# Patient Record
Sex: FEMALE | Race: WHITE | NOT HISPANIC OR LATINO | Employment: OTHER | URBAN - METROPOLITAN AREA
[De-identification: names, ages, dates, MRNs, and addresses within clinical notes are randomized per-mention and may not be internally consistent; named-entity substitution may affect disease eponyms.]

---

## 2017-06-22 ENCOUNTER — TRANSCRIBE ORDERS (OUTPATIENT)
Dept: ADMINISTRATIVE | Facility: HOSPITAL | Age: 57
End: 2017-06-22

## 2017-06-22 DIAGNOSIS — Z12.31 VISIT FOR SCREENING MAMMOGRAM: Primary | ICD-10-CM

## 2017-07-25 ENCOUNTER — HOSPITAL ENCOUNTER (OUTPATIENT)
Dept: MAMMOGRAPHY | Facility: MEDICAL CENTER | Age: 57
Discharge: HOME | End: 2017-07-25
Payer: COMMERCIAL

## 2017-07-25 DIAGNOSIS — Z12.31 VISIT FOR SCREENING MAMMOGRAM: ICD-10-CM

## 2017-07-25 PROCEDURE — G0202 SCR MAMMO BI INCL CAD: HCPCS

## 2017-12-06 PROCEDURE — 87624 HPV HI-RISK TYP POOLED RSLT: CPT | Performed by: OBSTETRICS & GYNECOLOGY

## 2017-12-06 PROCEDURE — G0145 SCR C/V CYTO,THINLAYER,RESCR: HCPCS | Performed by: OBSTETRICS & GYNECOLOGY

## 2017-12-08 ENCOUNTER — LAB REQUISITION (OUTPATIENT)
Dept: LAB | Facility: HOSPITAL | Age: 57
End: 2017-12-08
Payer: COMMERCIAL

## 2017-12-08 DIAGNOSIS — Z01.419 ENCOUNTER FOR GYNECOLOGICAL EXAMINATION WITHOUT ABNORMAL FINDING: ICD-10-CM

## 2017-12-12 LAB — HPV RRNA GENITAL QL NAA+PROBE: NORMAL

## 2017-12-13 LAB
LAB AP GYN PRIMARY INTERPRETATION: NORMAL
Lab: NORMAL

## 2018-08-22 ENCOUNTER — TRANSCRIBE ORDERS (OUTPATIENT)
Dept: ADMINISTRATIVE | Facility: HOSPITAL | Age: 58
End: 2018-08-22

## 2018-08-22 DIAGNOSIS — Z12.39 SCREENING BREAST EXAMINATION: Primary | ICD-10-CM

## 2018-10-02 ENCOUNTER — HOSPITAL ENCOUNTER (OUTPATIENT)
Dept: MAMMOGRAPHY | Facility: MEDICAL CENTER | Age: 58
Discharge: HOME | End: 2018-10-02
Payer: COMMERCIAL

## 2018-10-02 DIAGNOSIS — Z12.39 SCREENING BREAST EXAMINATION: ICD-10-CM

## 2018-10-02 PROCEDURE — 77067 SCR MAMMO BI INCL CAD: CPT

## 2019-02-06 ENCOUNTER — TELEPHONE (OUTPATIENT)
Dept: FAMILY MEDICINE CLINIC | Facility: CLINIC | Age: 59
End: 2019-02-06

## 2019-02-08 ENCOUNTER — ANNUAL EXAM (OUTPATIENT)
Dept: GYNECOLOGY | Facility: CLINIC | Age: 59
End: 2019-02-08
Payer: COMMERCIAL

## 2019-02-08 VITALS
HEIGHT: 64 IN | SYSTOLIC BLOOD PRESSURE: 112 MMHG | RESPIRATION RATE: 16 BRPM | BODY MASS INDEX: 23.93 KG/M2 | HEART RATE: 81 BPM | DIASTOLIC BLOOD PRESSURE: 64 MMHG | WEIGHT: 140.2 LBS | TEMPERATURE: 98.1 F

## 2019-02-08 DIAGNOSIS — Z01.419 ENCOUNTER FOR GYNECOLOGICAL EXAMINATION (GENERAL) (ROUTINE) WITHOUT ABNORMAL FINDINGS: Primary | ICD-10-CM

## 2019-02-08 DIAGNOSIS — Z12.31 ENCOUNTER FOR SCREENING MAMMOGRAM FOR MALIGNANT NEOPLASM OF BREAST: ICD-10-CM

## 2019-02-08 PROCEDURE — S0612 ANNUAL GYNECOLOGICAL EXAMINA: HCPCS | Performed by: OBSTETRICS & GYNECOLOGY

## 2019-02-08 RX ORDER — UREA 10 %
LOTION (ML) TOPICAL
COMMUNITY
End: 2021-12-13

## 2019-02-08 RX ORDER — ESCITALOPRAM OXALATE 5 MG/1
TABLET ORAL
COMMUNITY
End: 2021-12-13

## 2019-02-08 RX ORDER — ESTRADIOL AND NORETHINDRONE ACETATE 1; .5 MG/1; MG/1
TABLET ORAL
COMMUNITY
End: 2020-06-03 | Stop reason: ALTCHOICE

## 2019-02-08 NOTE — PROGRESS NOTES
Assessment/Plan:       Diagnoses and all orders for this visit:    Encounter for gynecological examination (general) (routine) without abnormal findings  -     Cancel: Liquid-based pap, screening    Screening for human papillomavirus (HPV)  -     Liquid-based pap, screening    Encounter for screening mammogram for malignant neoplasm of breast  -     Mammo screening bilateral w 3d & cad; Future    Other orders  -     calcium carbonate (OS-MILY) 1250 (500 Ca) MG chewable tablet; qd  -     escitalopram (LEXAPRO) 5 mg tablet; escitalopram 5 mg tablet  -     estradiol-norethindrone (ACTIVELLA) 1-0 5 MG per tablet; estradiol-norethindrone acet 0 5 mg-0 1 mg tablet  -     Multiple Vitamins-Minerals (MULTIVITAMIN ADULT EXTRA C PO); Take 1 capsule by mouth          Subjective:      Patient ID: Jules Lindsay is a 63 y o  female  The patient is a 20-year-old who presents for an annual gyn exam   She denies any vaginal bleeding, breast problems, or bladder issues  She is not sexually active  The following portions of the patient's history were reviewed and updated as appropriate: allergies, current medications, past family history, past medical history, past social history, past surgical history and problem list     Review of Systems   Constitutional: Negative  Respiratory: Negative for shortness of breath  Cardiovascular: Negative for chest pain  Gastrointestinal: Negative  Genitourinary: Negative  Skin: Negative  Psychiatric/Behavioral: Negative for agitation, behavioral problems and confusion  Objective:      /64 (BP Location: Right arm, Patient Position: Sitting, Cuff Size: Standard)   Pulse 81   Temp 98 1 °F (36 7 °C) (Tympanic)   Resp 16   Ht 5' 3 75" (1 619 m)   Wt 63 6 kg (140 lb 3 2 oz)   LMP  (LMP Unknown)   BMI 24 25 kg/m²          Physical Exam   Constitutional: She appears well-developed and well-nourished  No distress  HENT:   Head: Normocephalic  Pulmonary/Chest: Effort normal  No respiratory distress  Right breast exhibits no inverted nipple, no mass, no nipple discharge, no skin change and no tenderness  Left breast exhibits no inverted nipple, no mass, no nipple discharge, no skin change and no tenderness  Breasts are symmetrical    Abdominal: She exhibits no distension and no mass  There is no tenderness  There is no rebound and no guarding  Genitourinary: Rectum normal and uterus normal  No labial fusion  There is no rash, tenderness, lesion or injury on the right labia  There is no rash, tenderness, lesion or injury on the left labia  Uterus is not tender  Cervix exhibits no motion tenderness, no discharge and no friability  Right adnexum displays no mass, no tenderness and no fullness  Left adnexum displays no mass, no tenderness and no fullness  No erythema, tenderness or bleeding in the vagina  No foreign body in the vagina  No signs of injury around the vagina  No vaginal discharge found  Lymphadenopathy:        Right axillary: No pectoral and no lateral adenopathy present  Left axillary: No pectoral and no lateral adenopathy present  Skin: Skin is warm, dry and intact  She is not diaphoretic  No cyanosis  Nails show no clubbing  Psychiatric: She has a normal mood and affect   Her speech is normal and behavior is normal

## 2019-03-06 ENCOUNTER — TELEPHONE (OUTPATIENT)
Dept: FAMILY MEDICINE CLINIC | Facility: CLINIC | Age: 59
End: 2019-03-06

## 2019-03-06 DIAGNOSIS — N95.9 MENOPAUSAL DISORDER: Primary | ICD-10-CM

## 2019-03-06 RX ORDER — ESTRADIOL AND NORETHINDRONE ACETATE 1; .5 MG/1; MG/1
1 TABLET ORAL DAILY
Qty: 90 TABLET | Refills: 4 | Status: SHIPPED | OUTPATIENT
Start: 2019-03-06 | End: 2021-12-13

## 2019-03-06 NOTE — TELEPHONE ENCOUNTER
Patient called in regards to her medication refill for estradiol-norethindrone (ACTIVELLA) 1-0 5 MG per tablet - take   0 5 mg-0 1 mg tablet  She would like this called into [facility]   If there are any questions

## 2019-11-13 ENCOUNTER — HOSPITAL ENCOUNTER (OUTPATIENT)
Dept: MAMMOGRAPHY | Facility: MEDICAL CENTER | Age: 59
Discharge: HOME | End: 2019-11-13
Payer: COMMERCIAL

## 2019-11-13 VITALS — WEIGHT: 140.19 LBS | HEIGHT: 64 IN | BODY MASS INDEX: 23.93 KG/M2

## 2019-11-13 DIAGNOSIS — Z12.31 ENCOUNTER FOR SCREENING MAMMOGRAM FOR MALIGNANT NEOPLASM OF BREAST: ICD-10-CM

## 2019-11-13 PROCEDURE — 77063 BREAST TOMOSYNTHESIS BI: CPT

## 2019-11-13 PROCEDURE — 77067 SCR MAMMO BI INCL CAD: CPT

## 2019-12-30 ENCOUNTER — TELEPHONE (OUTPATIENT)
Dept: GYNECOLOGY | Facility: CLINIC | Age: 59
End: 2019-12-30

## 2019-12-30 NOTE — TELEPHONE ENCOUNTER
LM with patient that Dr Thang Kraus appointment is cancelled  Told her to call our office to make appointment

## 2020-05-04 ENCOUNTER — TELEMEDICINE (OUTPATIENT)
Dept: GYNECOLOGY | Facility: CLINIC | Age: 60
End: 2020-05-04
Payer: COMMERCIAL

## 2020-05-04 DIAGNOSIS — Z78.0 MENOPAUSE: Primary | ICD-10-CM

## 2020-05-04 PROCEDURE — 99213 OFFICE O/P EST LOW 20 MIN: CPT | Performed by: OBSTETRICS & GYNECOLOGY

## 2020-05-05 ENCOUNTER — TELEPHONE (OUTPATIENT)
Dept: GYNECOLOGY | Facility: CLINIC | Age: 60
End: 2020-05-05

## 2020-06-03 ENCOUNTER — ANNUAL EXAM (OUTPATIENT)
Dept: GYNECOLOGY | Facility: CLINIC | Age: 60
End: 2020-06-03
Payer: COMMERCIAL

## 2020-06-03 VITALS
HEART RATE: 73 BPM | WEIGHT: 164 LBS | SYSTOLIC BLOOD PRESSURE: 120 MMHG | BODY MASS INDEX: 30.18 KG/M2 | DIASTOLIC BLOOD PRESSURE: 78 MMHG | HEIGHT: 62 IN

## 2020-06-03 DIAGNOSIS — Z01.419 ENCOUNTER FOR GYNECOLOGICAL EXAMINATION (GENERAL) (ROUTINE) WITHOUT ABNORMAL FINDINGS: Primary | ICD-10-CM

## 2020-06-03 DIAGNOSIS — Z01.419 ENCOUNTER FOR GYNECOLOGICAL EXAMINATION WITH PAPANICOLAOU SMEAR OF CERVIX: ICD-10-CM

## 2020-06-03 DIAGNOSIS — Z12.31 SCREENING MAMMOGRAM, ENCOUNTER FOR: ICD-10-CM

## 2020-06-03 PROCEDURE — S0612 ANNUAL GYNECOLOGICAL EXAMINA: HCPCS | Performed by: OBSTETRICS & GYNECOLOGY

## 2020-06-03 PROCEDURE — 87624 HPV HI-RISK TYP POOLED RSLT: CPT | Performed by: OBSTETRICS & GYNECOLOGY

## 2020-06-03 PROCEDURE — G0145 SCR C/V CYTO,THINLAYER,RESCR: HCPCS | Performed by: OBSTETRICS & GYNECOLOGY

## 2020-06-06 LAB
HPV HR 12 DNA CVX QL NAA+PROBE: NEGATIVE
HPV16 DNA CVX QL NAA+PROBE: NEGATIVE
HPV18 DNA CVX QL NAA+PROBE: NEGATIVE

## 2020-06-16 LAB
LAB AP GYN PRIMARY INTERPRETATION: NORMAL
Lab: NORMAL

## 2020-08-03 ENCOUNTER — TELEPHONE (OUTPATIENT)
Dept: GYNECOLOGY | Facility: CLINIC | Age: 60
End: 2020-08-03

## 2020-08-03 NOTE — TELEPHONE ENCOUNTER
Carine Teixeira called and would like to ask some questions about weaning off of    estradiol-norethindrone (ACTIVELLA) 1-0 5 MG per tablet

## 2020-08-03 NOTE — TELEPHONE ENCOUNTER
I spoke with patient who states she has weaned down to every third day and is not having not flashes but is being worked up for other concerns with b/l leg discomfort, "tingling" and dizziness  I advised patient to inform PCP that she is weaning off of HRT

## 2020-11-18 ENCOUNTER — HOSPITAL ENCOUNTER (OUTPATIENT)
Dept: MAMMOGRAPHY | Facility: CLINIC | Age: 60
Discharge: HOME | End: 2020-11-18
Payer: COMMERCIAL

## 2020-11-18 DIAGNOSIS — Z12.31 SCREENING MAMMOGRAM, ENCOUNTER FOR: ICD-10-CM

## 2020-11-18 DIAGNOSIS — Z12.31 ENCOUNTER FOR SCREENING MAMMOGRAM FOR MALIGNANT NEOPLASM OF BREAST: ICD-10-CM

## 2020-11-18 PROCEDURE — 77067 SCR MAMMO BI INCL CAD: CPT

## 2020-11-18 PROCEDURE — 77063 BREAST TOMOSYNTHESIS BI: CPT

## 2021-12-13 ENCOUNTER — ANNUAL EXAM (OUTPATIENT)
Dept: GYNECOLOGY | Facility: CLINIC | Age: 61
End: 2021-12-13
Payer: COMMERCIAL

## 2021-12-13 VITALS
HEART RATE: 77 BPM | SYSTOLIC BLOOD PRESSURE: 118 MMHG | DIASTOLIC BLOOD PRESSURE: 74 MMHG | WEIGHT: 153 LBS | HEIGHT: 62 IN | BODY MASS INDEX: 28.16 KG/M2

## 2021-12-13 DIAGNOSIS — Z78.0 MENOPAUSE: ICD-10-CM

## 2021-12-13 DIAGNOSIS — Z13.820 OSTEOPOROSIS SCREENING: ICD-10-CM

## 2021-12-13 DIAGNOSIS — R10.2 PELVIC PAIN: ICD-10-CM

## 2021-12-13 DIAGNOSIS — Z01.411 ENCOUNTER FOR GYNECOLOGICAL EXAMINATION (GENERAL) (ROUTINE) WITH ABNORMAL FINDINGS: Primary | ICD-10-CM

## 2021-12-13 DIAGNOSIS — Z12.31 SCREENING MAMMOGRAM FOR BREAST CANCER: ICD-10-CM

## 2021-12-13 PROCEDURE — S0612 ANNUAL GYNECOLOGICAL EXAMINA: HCPCS | Performed by: OBSTETRICS & GYNECOLOGY

## 2021-12-13 RX ORDER — MULTIVITAMIN/IRON/FOLIC ACID 18MG-0.4MG
TABLET ORAL
COMMUNITY
Start: 2021-08-01

## 2021-12-13 RX ORDER — BUPROPION HYDROCHLORIDE 150 MG/1
TABLET ORAL
COMMUNITY
Start: 2021-08-05

## 2021-12-13 RX ORDER — AMOXICILLIN 500 MG
CAPSULE ORAL
COMMUNITY
Start: 2020-12-13

## 2022-02-04 ENCOUNTER — TELEPHONE (OUTPATIENT)
Dept: GYNECOLOGY | Facility: CLINIC | Age: 62
End: 2022-02-04

## 2022-02-04 NOTE — TELEPHONE ENCOUNTER
Spoke with patient who states she has not had TVU done to date  She stopped nuts and pain resolved until a week ago when she had an english muffin with peanut butter and pain returned  Advised that if associated with food, pain is more than likely GI related  Patient will cont to monitor and call with any questions

## 2022-02-22 ENCOUNTER — TELEPHONE (OUTPATIENT)
Dept: GYNECOLOGY | Facility: CLINIC | Age: 62
End: 2022-02-22

## 2022-02-22 NOTE — TELEPHONE ENCOUNTER
Alyson Charles called and states She had a Dexa done at [facility] 4/29/21 and is going to cancel the appt  She has scheduled since it has not been 2 years  Pt wanted to make you aware

## 2022-03-09 ENCOUNTER — APPOINTMENT (OUTPATIENT)
Dept: BONE DENSITY | Facility: MEDICAL CENTER | Age: 62
End: 2022-03-09
Payer: COMMERCIAL

## 2022-03-09 ENCOUNTER — HOSPITAL ENCOUNTER (OUTPATIENT)
Dept: MAMMOGRAPHY | Facility: MEDICAL CENTER | Age: 62
Discharge: HOME | End: 2022-03-09
Payer: COMMERCIAL

## 2022-03-09 VITALS — HEIGHT: 62 IN | BODY MASS INDEX: 28.16 KG/M2 | WEIGHT: 153 LBS

## 2022-03-09 DIAGNOSIS — Z12.31 SCREENING MAMMOGRAM FOR BREAST CANCER: ICD-10-CM

## 2022-03-09 PROCEDURE — 77067 SCR MAMMO BI INCL CAD: CPT

## 2022-03-09 PROCEDURE — 77063 BREAST TOMOSYNTHESIS BI: CPT

## 2022-03-23 ENCOUNTER — DOCUMENTATION (OUTPATIENT)
Dept: GYNECOLOGY | Facility: CLINIC | Age: 62
End: 2022-03-23

## 2022-03-30 ENCOUNTER — DOCUMENTATION (OUTPATIENT)
Dept: GYNECOLOGY | Facility: CLINIC | Age: 62
End: 2022-03-30

## 2022-03-30 NOTE — PROGRESS NOTES
Called she is not going to need the U/S , felt bad a t the time  But is fine now  Also no need, for mammo  , dexa  Either  They are up to date

## 2023-01-09 NOTE — PROGRESS NOTES
Assessment/Plan:    Recommended monthly SBE, annual CBE and annual screening mammo  ASCCP guidelines reviewed and pap with cotesting noted to be up to date; this low risk patient was advised she meets criteria to d/c pap screening at age 73  Pap done today  DEXA ordered and colonoscopy noted to be up to date  Reviewed diet/activity recommendations Calcium 1200 mg and Vit D 600-1000 IU daily  Discussed postmenopausal considerations and symptoms to report  Kegel exercises as instructed  RTO in one year for routine annual gyn exam or sooner PRN  Diagnoses and all orders for this visit:    Encounter for gynecological examination (general) (routine) without abnormal findings    Screening mammogram for breast cancer  -     Mammo screening bilateral w 3d & cad; Future    Osteoporosis screening  -     DXA bone density spine hip and pelvis; Future    Menopause  -     DXA bone density spine hip and pelvis; Future    Well woman exam with routine gynecological exam  -     Liquid-based pap, screening    Encounter for screening for malignant neoplasm of cervix  -     Liquid-based pap, screening    Screening for HPV (human papillomavirus)  -     Liquid-based pap, screening    Other orders  -     levocetirizine (XYZAL) 5 MG tablet; Take 5 mg by mouth every evening  -     calcium citrate-vitamin D 315 mg-5 mcg tablet; Take 1 tablet by mouth 2 (two) times a day        Subjective:      Patient ID: Faith Crowe is a 65 y o  female  This patient presents for routine annual gyn exam   Hx of genital herpes, denies recent outbreaks  She denies  bleeding or spotting, VM sx, pelvic pain, dyspareunia, breast concerns, abnormal discharge, bowel/bladder dysfunction, depression/anx  Not sexually active  Pap/HPV up to date and normal, 6/3/20  Mammography up to date and normal, 3/9/22  Osteoporosis screening not done to date  Colonoscopy 4/28/16  Family hx of breast and colon cancer           The following portions of the patient's history were reviewed and updated as appropriate: allergies, current medications, past family history, past medical history, past social history, past surgical history and problem list     Review of Systems   Constitutional: Negative  Respiratory: Negative  Cardiovascular: Negative  Gastrointestinal: Negative  Endocrine: Negative  Genitourinary: Negative for dysuria, frequency, pelvic pain, urgency, vaginal bleeding, vaginal discharge and vaginal pain  Musculoskeletal: Negative  Skin: Negative  Neurological: Negative  Psychiatric/Behavioral: Negative  Objective:      /82 (BP Location: Right arm, Patient Position: Sitting, Cuff Size: Standard)   Pulse 83   Ht 5' 2" (1 575 m)   Wt 72 1 kg (159 lb)   LMP  (LMP Unknown)   BMI 29 08 kg/m²          Physical Exam  Vitals and nursing note reviewed  Exam conducted with a chaperone present  Constitutional:       Appearance: Normal appearance  She is well-developed  HENT:      Head: Normocephalic and atraumatic  Neck:      Thyroid: No thyroid mass or thyromegaly  Cardiovascular:      Rate and Rhythm: Normal rate and regular rhythm  Heart sounds: Normal heart sounds  Pulmonary:      Effort: Pulmonary effort is normal       Breath sounds: Normal breath sounds  Chest:   Breasts:     Breasts are symmetrical       Right: No inverted nipple, mass, nipple discharge, skin change or tenderness  Left: No inverted nipple, mass, nipple discharge, skin change or tenderness  Abdominal:      General: Bowel sounds are normal       Palpations: Abdomen is soft  Tenderness: There is no abdominal tenderness  Hernia: There is no hernia in the left inguinal area or right inguinal area  Genitourinary:     General: Normal vulva  Exam position: Supine  Pubic Area: No rash  Labia:         Right: No rash, tenderness, lesion or injury  Left: No rash, tenderness, lesion or injury  Urethra: No prolapse, urethral pain, urethral swelling or urethral lesion  Vagina: Normal  No signs of injury and foreign body  No vaginal discharge, erythema, tenderness, bleeding, lesions or prolapsed vaginal walls  Cervix: No cervical motion tenderness, discharge, friability, lesion, erythema, cervical bleeding or eversion  Uterus: Not deviated, not enlarged, not fixed, not tender and no uterine prolapse  Adnexa:         Right: No mass, tenderness or fullness  Left: No mass, tenderness or fullness  Rectum: No external hemorrhoid  Comments: Urethra normal without lesions  No bladder tenderness  Musculoskeletal:         General: Normal range of motion  Cervical back: Normal range of motion and neck supple  Lymphadenopathy:      Lower Body: No right inguinal adenopathy  No left inguinal adenopathy  Skin:     General: Skin is warm and dry  Neurological:      Mental Status: She is alert and oriented to person, place, and time  Psychiatric:         Speech: Speech normal          Behavior: Behavior normal  Behavior is cooperative

## 2023-01-11 ENCOUNTER — ANNUAL EXAM (OUTPATIENT)
Dept: GYNECOLOGY | Facility: CLINIC | Age: 63
End: 2023-01-11

## 2023-01-11 VITALS
BODY MASS INDEX: 29.26 KG/M2 | SYSTOLIC BLOOD PRESSURE: 124 MMHG | HEART RATE: 83 BPM | HEIGHT: 62 IN | WEIGHT: 159 LBS | DIASTOLIC BLOOD PRESSURE: 82 MMHG

## 2023-01-11 DIAGNOSIS — Z01.419 ENCOUNTER FOR GYNECOLOGICAL EXAMINATION (GENERAL) (ROUTINE) WITHOUT ABNORMAL FINDINGS: Primary | ICD-10-CM

## 2023-01-11 DIAGNOSIS — Z11.51 SCREENING FOR HPV (HUMAN PAPILLOMAVIRUS): ICD-10-CM

## 2023-01-11 DIAGNOSIS — Z01.419 WELL WOMAN EXAM WITH ROUTINE GYNECOLOGICAL EXAM: ICD-10-CM

## 2023-01-11 DIAGNOSIS — Z13.820 OSTEOPOROSIS SCREENING: ICD-10-CM

## 2023-01-11 DIAGNOSIS — Z12.31 SCREENING MAMMOGRAM FOR BREAST CANCER: ICD-10-CM

## 2023-01-11 DIAGNOSIS — Z12.4 ENCOUNTER FOR SCREENING FOR MALIGNANT NEOPLASM OF CERVIX: ICD-10-CM

## 2023-01-11 DIAGNOSIS — Z78.0 MENOPAUSE: ICD-10-CM

## 2023-01-11 RX ORDER — LANOLIN ALCOHOL/MO/W.PET/CERES
1 CREAM (GRAM) TOPICAL 2 TIMES DAILY
COMMUNITY

## 2023-01-11 RX ORDER — LEVOCETIRIZINE DIHYDROCHLORIDE 5 MG/1
5 TABLET, FILM COATED ORAL EVERY EVENING
COMMUNITY
Start: 2022-12-23

## 2023-01-20 LAB
LAB AP GYN PRIMARY INTERPRETATION: NORMAL
Lab: NORMAL

## 2023-07-25 ENCOUNTER — TELEPHONE (OUTPATIENT)
Dept: GYNECOLOGY | Facility: CLINIC | Age: 63
End: 2023-07-25

## 2023-09-13 ENCOUNTER — HOSPITAL ENCOUNTER (OUTPATIENT)
Dept: MAMMOGRAPHY | Facility: MEDICAL CENTER | Age: 63
Discharge: HOME | End: 2023-09-13
Payer: COMMERCIAL

## 2023-09-13 VITALS — HEIGHT: 62 IN | BODY MASS INDEX: 29.25 KG/M2 | WEIGHT: 158.95 LBS

## 2023-09-13 DIAGNOSIS — Z12.31 SCREENING MAMMOGRAM FOR BREAST CANCER: ICD-10-CM

## 2023-09-13 PROCEDURE — 77067 SCR MAMMO BI INCL CAD: CPT

## 2023-09-13 PROCEDURE — 77063 BREAST TOMOSYNTHESIS BI: CPT

## 2023-09-15 NOTE — PROGRESS NOTES
Call placed to patient regarding recommendation for additional mammogram and ultrasound imaging and;    _____ RIGHT ___X___LEFT      __X___ Ultrasound guided  ______ Stereotactic breast biopsy. Explained the need for C[facility] imaging and the biopsy to follow if there is enhancement on the imaging, all questions answered for the patient. Explained the location of this imaging at our [facility] location. __X___Verbalized understanding.       Blood thinners:  No: __X___ Yes: ______ What:     Patient offered One Stop Appointment: Accepted ___x_____  Declined: _______  Reason:            All questions related to the CESM imaging completed, pt with no history that warrants lab work/explained the need for lab work prior to imaging due to the administration of IV contrast, adv that I will call her provider and get this ordered, adv the patient to get this done within the next week so we have those results before her testing, pt states understanding       Biopsy teaching sheet given:  ____ yes __X___no (All teaching points discussed during call, pt with no questions at this time, pt adv to arrive at 0800for 0830 CESM followed by 0900 US then 0930 biopsy)    Pt given contact information and adv to call with any questions/needs    Patient given address and directions to the [facility] in [facility]

## 2023-09-19 ENCOUNTER — HOSPITAL ENCOUNTER (OUTPATIENT)
Dept: MAMMOGRAPHY | Facility: CLINIC | Age: 63
Discharge: HOME | End: 2023-09-19
Payer: COMMERCIAL

## 2023-09-19 ENCOUNTER — HOSPITAL ENCOUNTER (OUTPATIENT)
Dept: ULTRASOUND IMAGING | Facility: CLINIC | Age: 63
Discharge: HOME | End: 2023-09-19
Payer: COMMERCIAL

## 2023-09-19 VITALS — DIASTOLIC BLOOD PRESSURE: 98 MMHG | HEART RATE: 62 BPM | SYSTOLIC BLOOD PRESSURE: 147 MMHG

## 2023-09-19 VITALS — HEART RATE: 65 BPM | SYSTOLIC BLOOD PRESSURE: 131 MMHG | DIASTOLIC BLOOD PRESSURE: 98 MMHG

## 2023-09-19 DIAGNOSIS — R92.8 ABNORMAL ULTRASOUND OF BREAST: ICD-10-CM

## 2023-09-19 DIAGNOSIS — R92.8 ABNORMAL MAMMOGRAM: ICD-10-CM

## 2023-09-19 PROCEDURE — 88360 TUMOR IMMUNOHISTOCHEM/MANUAL: CPT | Performed by: PATHOLOGY

## 2023-09-19 PROCEDURE — 88342 IMHCHEM/IMCYTCHM 1ST ANTB: CPT | Performed by: PATHOLOGY

## 2023-09-19 PROCEDURE — 19083 BX BREAST 1ST LESION US IMAG: CPT

## 2023-09-19 PROCEDURE — A4648 IMPLANTABLE TISSUE MARKER: HCPCS

## 2023-09-19 PROCEDURE — 76642 ULTRASOUND BREAST LIMITED: CPT

## 2023-09-19 PROCEDURE — 88305 TISSUE EXAM BY PATHOLOGIST: CPT | Performed by: PATHOLOGY

## 2023-09-19 PROCEDURE — 88341 IMHCHEM/IMCYTCHM EA ADD ANTB: CPT | Performed by: PATHOLOGY

## 2023-09-19 PROCEDURE — 77066 DX MAMMO INCL CAD BI: CPT

## 2023-09-19 RX ORDER — LIDOCAINE HYDROCHLORIDE 10 MG/ML
5 INJECTION, SOLUTION EPIDURAL; INFILTRATION; INTRACAUDAL; PERINEURAL ONCE
Status: COMPLETED | OUTPATIENT
Start: 2023-09-19 | End: 2023-09-19

## 2023-09-19 RX ORDER — LIDOCAINE HYDROCHLORIDE 10 MG/ML
5 INJECTION, SOLUTION EPIDURAL; INFILTRATION; INTRACAUDAL; PERINEURAL ONCE
Status: DISCONTINUED | OUTPATIENT
Start: 2023-09-19 | End: 2023-09-20 | Stop reason: HOSPADM

## 2023-09-19 RX ADMIN — IOHEXOL 100 ML: 350 INJECTION, SOLUTION INTRAVENOUS at 08:42

## 2023-09-19 RX ADMIN — LIDOCAINE HYDROCHLORIDE 5 ML: 10 INJECTION, SOLUTION EPIDURAL; INFILTRATION; INTRACAUDAL; PERINEURAL at 09:29

## 2023-09-19 NOTE — PROGRESS NOTES
Procedure type:    ___x__ultrasound guided _____stereotactic    Breast:    ___x__Left _____Right    Location:12 o'clock 3cmfn    Needle: 12G Destiney    # of passes:4    Clip:Leola  Reflector 7.5cm    Performed by:Dr. Teague    Pressure held for 5 minutes by:Delfina Hoff Strips:    ___X__yes _____no    Gail Hunger aid:    __X___yes_____no    Tolerated procedure:    __X___yes _____no

## 2023-09-20 NOTE — PROGRESS NOTES
Post procedure call completed    Bleeding: _____yes __X___no    Pain: _____yes ___X___no    Redness/Swelling: ______yes ___X___no    Band aid removed: _____yes ___X__no (discussed removing when she showers)    Steri-Strips intact: ___X___yes _____no (discussed with patient to remove steri strips on 9/24/2023 if they have not come off on their own)    Pt with no questions at this time, adv will call when results available, adv to call with any questions or concerns, has name/# for contact

## 2023-09-21 ENCOUNTER — CLINICAL SUPPORT (OUTPATIENT)
Dept: GENETICS | Facility: CLINIC | Age: 63
End: 2023-09-21

## 2023-09-21 ENCOUNTER — TELEPHONE (OUTPATIENT)
Dept: GENETICS | Facility: CLINIC | Age: 63
End: 2023-09-21

## 2023-09-21 ENCOUNTER — DOCUMENTATION (OUTPATIENT)
Dept: HEMATOLOGY ONCOLOGY | Facility: CLINIC | Age: 63
End: 2023-09-21

## 2023-09-21 ENCOUNTER — DOCUMENTATION (OUTPATIENT)
Dept: GENETICS | Facility: CLINIC | Age: 63
End: 2023-09-21

## 2023-09-21 ENCOUNTER — TELEPHONE (OUTPATIENT)
Dept: MAMMOGRAPHY | Facility: CLINIC | Age: 63
End: 2023-09-21

## 2023-09-21 DIAGNOSIS — C50.912 MALIGNANT NEOPLASM OF LEFT FEMALE BREAST, UNSPECIFIED ESTROGEN RECEPTOR STATUS, UNSPECIFIED SITE OF BREAST (HCC): Primary | ICD-10-CM

## 2023-09-21 PROCEDURE — 88341 IMHCHEM/IMCYTCHM EA ADD ANTB: CPT | Performed by: PATHOLOGY

## 2023-09-21 PROCEDURE — 88305 TISSUE EXAM BY PATHOLOGIST: CPT | Performed by: PATHOLOGY

## 2023-09-21 PROCEDURE — 88342 IMHCHEM/IMCYTCHM 1ST ANTB: CPT | Performed by: PATHOLOGY

## 2023-09-21 NOTE — PROGRESS NOTES
IIntake received/ Chart reviewed for services completed outside of [facility]    Pathology completed: 9/19/23    Imaging completed: Screening Mammogram done: 9/13/23  Diagnostic Mammogram and Ultrasound done: 9/19/23    All records needed are in patients chart. No records retrieval needed at this time.

## 2023-09-21 NOTE — LETTER
September 21, 2023       No Recipients    Patient: Meagan Nolasco  YOB: 1960  Date of Visit: 9/21/2023      Dear Dr. Fernando Armando Recipients: Thank you for referring Chivo Xie to me for evaluation. Below are my notes for this consultation. If you have questions, please do not hesitate to call me. I look forward to following your patient along with you. Sincerely,        Mango Mckeon        CC:   No Recipients        I introduced myself to Julio Bryan and let her know that her nurse navigator reached out to the cancer risk and genetics program on her behalf. I reviewed the following with [place] Corie Silva: While the majority of cancer occurs by chance, approximately 5-10% of breast cancer has an underlying genetic cause. Genetic testing is available which can determine if there is an underlying genetic cause to your cancer. Understanding if there is an underlying genetic cause can:  Provide your surgeon with additional information to help with surgical decisions, treatment decisions and eligibility for clinical trials. It can determine if you have an increased risk for any additional cancers. Help family members understand their cancer risk. We work closely with the [facility] breast surgeons and are reaching out to see if you have interest in genetic testing. The reason we are reaching out at this time is that this result may help your surgeon determine the appropriate type of surgery (i.e. lumpectomy vs mastectomy). This test is not a requirement but can take 5-10 days to complete so we would like to start the process as soon as possible so the results are ready for your appointment with your surgeon. If you are interested in genetic testing, I can collect your family history and initiate genetic testing for you.         Patient elected to pursue testing     Diagnosis Details:  Invasive Ductal Carcinoma  Left  Hormone receptors pending  Personal History:  Do you have a personal history of any other cancer? No  If yes type/age of diagnosis:   Family history:   Do you have Ashkenazi Islam ancestry? No  If yes, maternal, paternal, or both? Do you have any children? Yes  How many sons? 1  How many daughters? 1 (passed away 11 hours after birth)   Do any of your children have a history of cancer? No  If yes type/age of diagnosis:     Do you have any brothers or sisters? Yes  How many brothers? 2  How many sisters? 1  Are they from the same parents? Yes  If no how maternal/paternal half-siblings:  Do any of your brothers or sisters have a history of cancer? No  If yes who and the type/age of diagnosis:           Do you have nieces or nephews? Yes  Do any of them have a history of cancer? Yes  If yes type/age of diagnosis:   Nephew: unknown cancer, ? Lymphoma, diagnosed at 44 (currently 38 y/o)   We reviewed that there is emerging evidence that Lymphoma may be a hereditary cancer but we cannot test for a predisposition for it at this time. Does your mother have a history of cancer? No  If yes, cancer type and age of diagnosis:   Is your mother still living? No  Age/Age of death: 80 (MVA)     Thinking about your mother's family (aunts, uncles, cousins, grandparents) is there anyone with a history of cancer? Yes  If yes, list relationship, cancer type and age of diagnosis:  Maternal aunt: breast cancer diagnosed in 62s (d. 80s- Alzheimer's)  Grandmother: ? Stomach cancer diagnosed in 80s (d.90s)     Does your father have a history of cancer? No  If yes, cancer type and age of diagnosis:  Is your father still living? No  Age/age of death: 80 (Alzheimer's)     Thinking about your father's family (aunts, uncles, cousins, grandparents) is there anyone with a history of cancer? Yes  If yes, list relationship, cancer type and age of diagnosis:   Grandfather: stomach cancer diagnosed in 80s (d.90s)  Grandmother: colon cancer diagnosed in 46s (d.  Late 46s)   First- cousin: breast cancer diagnosed <50 (d.50s)      Types of Results - Positive, Negative, VUS  Positive result- may explain personal diagnosis/family history. Can give surgeon information on treatment plan, inform future screening/management or tell a person about other possible risks. Positive results can initiate testing for other family members who may be at risk (children, siblings, etc)  Negative result- does not give an explanation. Surgical/treatment plan will be based on clinical presentation and will be part of discussion with surgeon. Negative result cannot be passed down to children, but they are still at elevated risk. Uncertain result- common, but treated like a negative result clinically. 90% are downgraded over time. [employer] cover the cost of genetic testing. The out-of-pocket cost varies due to the differences in deductibles, co-payments and co-insurance defined by individual plans but 90% of people pay $100 or less for a genetic test     A blood kit will be mailed to you overnight. Please take the blood kit along with packet of paperwork to any [facility]'s lab to have your blood drawn. Plan:  A blood kit was mailed out on 9/21/23 along with information on genetic testing and the lab's billing policy.      Genetic Testing Preformed: [facility] BRCAplus STAT Panel (8 genes): NUZHAT, BRCA1, BRCA2, CDH1, CHEK2, PALB2, PTEN, TP53 with reflex to [employer] CustomNext Cancer Panel + RNA (47 genes): APC, AXIN2, BARD1, BMPR1A, BRIP1, CDK4, CDKN2A, CTNNA1, DICER1, EPCAM, GREM1, HOXB13, KIT, MEN1, MLH1, MSH2, MSH3, MSH6, MUTYH, NBN, NF1, NTHL1, PDGFRA, PMS2, POLD1, POLE, RAD50, RAD51C, RAD51D, SDHA, SDHB, SDHC, SDHD, SMAD4, SMARCA4, STK11, TSC1, TSC2, VHL      Result Call Information:  I confirmed the patient's mobile number on file as the best number to call with results  I confirmed with the patient that we can leave a voicemail on her mobile number    Initial results will take approximately 5-12 days to return     Additional results may take up to 2-3 weeks to complete once test is started. When your results are ready, someone from the genetics team will call you, review the results, and contact your breast surgeon. You will be contacted with any type of result- positive, negative, or uncertain.

## 2023-09-21 NOTE — TELEPHONE ENCOUNTER
Spoke with Charlie Recinos to initiate STAT genetic testing process. Please see pre-test counseling note.

## 2023-09-21 NOTE — PROGRESS NOTES
I introduced myself to Julio Bryan and let her know that her nurse navigator reached out to the cancer risk and genetics program on her behalf. I reviewed the following with Julio Bryan:    • While the majority of cancer occurs by chance, approximately 5-10% of breast cancer has an underlying genetic cause. Genetic testing is available which can determine if there is an underlying genetic cause to your cancer. Understanding if there is an underlying genetic cause can:  o Provide your surgeon with additional information to help with surgical decisions, treatment decisions and eligibility for clinical trials. o It can determine if you have an increased risk for any additional cancers. o Help family members understand their cancer risk. • We work closely with the [facility] breast surgeons and are reaching out to see if you have interest in genetic testing. The reason we are reaching out at this time is that this result may help your surgeon determine the appropriate type of surgery (i.e. lumpectomy vs mastectomy). This test is not a requirement but can take 5-10 days to complete so we would like to start the process as soon as possible so the results are ready for your appointment with your surgeon. • If you are interested in genetic testing, I can collect your family history and initiate genetic testing for you. •   Patient elected to pursue testing     • Diagnosis Details:  o Invasive Ductal Carcinoma  o Left  o Hormone receptors pending  • Personal History:  o Do you have a personal history of any other cancer? No  - If yes type/age of diagnosis:   • Family history:   o Do you have Ashkenazi Confucianist ancestry? No  - If yes, maternal, paternal, or both?    o Do you have any children? Yes  - How many sons? 1  - How many daughters? 1 (passed away 11 hours after birth)   - Do any of your children have a history of cancer?  No  - If yes type/age of diagnosis:     o Do you have any brothers or sisters? Yes  - How many brothers? 2  - How many sisters? 1  - Are they from the same parents? Yes  - If no how maternal/paternal half-siblings:  - Do any of your brothers or sisters have a history of cancer? No  - If yes who and the type/age of diagnosis:           Do you have nieces or nephews? Yes  - Do any of them have a history of cancer? Yes  - If yes type/age of diagnosis:   Nephew: unknown cancer, ? Lymphoma, diagnosed at 44 (currently 38 y/o)   We reviewed that there is emerging evidence that Lymphoma may be a hereditary cancer but we cannot test for a predisposition for it at this time. o Does your mother have a history of cancer? No  - If yes, cancer type and age of diagnosis:   - Is your mother still living? No  - Age/Age of death: 80 (MVA)     o Thinking about your mother's family (aunts, uncles, cousins, grandparents) is there anyone with a history of cancer? Yes  - If yes, list relationship, cancer type and age of diagnosis:  Maternal aunt: breast cancer diagnosed in 62s (d. 80s- Alzheimer's)  Grandmother: ? Stomach cancer diagnosed in 80s (d.90s)     o Does your father have a history of cancer? No  - If yes, cancer type and age of diagnosis:  - Is your father still living? No  - Age/age of death: 80 (Alzheimer's)     o Thinking about your father's family (aunts, uncles, cousins, grandparents) is there anyone with a history of cancer? Yes  - If yes, list relationship, cancer type and age of diagnosis:   Grandfather: stomach cancer diagnosed in 80s (d.90s)  Grandmother: colon cancer diagnosed in 46s (d. Late 46s)   First- cousin: breast cancer diagnosed <50 (d.50s)      • Types of Results - Positive, Negative, VUS  o Positive result- may explain personal diagnosis/family history. Can give surgeon information on treatment plan, inform future screening/management or tell a person about other possible risks.  Positive results can initiate testing for other family members who may be at risk (children, siblings, etc)  o Negative result- does not give an explanation. Surgical/treatment plan will be based on clinical presentation and will be part of discussion with surgeon. Negative result cannot be passed down to children, but they are still at elevated risk. o Uncertain result- common, but treated like a negative result clinically. 90% are downgraded over time. • [facility]   o Most insurance plans cover the cost of genetic testing. The out-of-pocket cost varies due to the differences in deductibles, co-payments and co-insurance defined by individual plans but 90% of people pay $100 or less for a genetic test     A blood kit will be mailed to you overnight. Please take the blood kit along with packet of paperwork to any [facility] lab to have your blood drawn. Plan:  A blood kit was mailed out on 9/21/23 along with information on genetic testing and the lab's billing policy. Genetic Testing Preformed: [employer] BRCAplus STAT Panel (8 genes): NUZHAT, BRCA1, BRCA2, CDH1, CHEK2, PALB2, PTEN, TP53 with reflex to [employer] CustomNext Cancer Panel + RNA (47 genes): APC, AXIN2, BARD1, BMPR1A, BRIP1, CDK4, CDKN2A, CTNNA1, DICER1, EPCAM, GREM1, HOXB13, KIT, MEN1, MLH1, MSH2, MSH3, MSH6, MUTYH, NBN, NF1, NTHL1, PDGFRA, PMS2, POLD1, POLE, RAD50, RAD51C, RAD51D, SDHA, SDHB, SDHC, SDHD, SMAD4, SMARCA4, STK11, TSC1, TSC2, VHL      Result Call Information:  I confirmed the patient's mobile number on file as the best number to call with results  I confirmed with the patient that we can leave a voicemail on her mobile number    Initial results will take approximately 5-12 days to return     Additional results may take up to 2-3 weeks to complete once test is started. When your results are ready, someone from the genetics team will call you, review the results, and contact your breast surgeon. You will be contacted with any type of result- positive, negative, or uncertain.

## 2023-09-21 NOTE — TELEPHONE ENCOUNTER
[facility] Surgical Oncology Referral    Diagnosis:Invasive breast carcinoma     Is this diagnosis cancer (Y/N):yes    Biopsy Date: 9/19/2023    Does the patient have another biopsy pending:  If so, when:    Preferred provider: Dr. Juan Luis Sorto    Preferred location:    Any requests for dates/times: asap    Any additional information:     Please advise when appointment is made, thank you.

## 2023-09-21 NOTE — TELEPHONE ENCOUNTER
[address] [place] [address] Newly Diagnosed Genetics Checklist    Please route all intake forms to 'oncology genetics breast' pool in epic. This includes patients that decline testing. Patient is under 60 at the time of diagnosis discuss genetics (script below)    Script for Genetics:  Approximately 5-10% of cancer can have an underlying genetic cause. Genetic testing is recommended for women diagnosed with breast cancer under the age of 61. Your breast surgeon recommends that you have genetic testing to help determine your surgical plan. Our genetics team will call you in 24-48 hours to discuss this test and schedule a blood draw. The Genetics team will be able to address your questions. Outcome:    Patient is under 60: No    Referral Outcome: Patient does not meet under 60 criteria, but is still interested in pursuing genetic testing.  A referral was placed to the oncology genetics program.

## 2023-09-22 ENCOUNTER — TELEPHONE (OUTPATIENT)
Dept: MAMMOGRAPHY | Facility: CLINIC | Age: 63
End: 2023-09-22

## 2023-09-22 ENCOUNTER — APPOINTMENT (OUTPATIENT)
Dept: LAB | Facility: MEDICAL CENTER | Age: 63
End: 2023-09-22
Payer: COMMERCIAL

## 2023-09-22 ENCOUNTER — PATIENT OUTREACH (OUTPATIENT)
Dept: HEMATOLOGY ONCOLOGY | Facility: CLINIC | Age: 63
End: 2023-09-22

## 2023-09-22 DIAGNOSIS — Z80.8 FAMILY HISTORY OF OTHER SPECIFIED MALIGNANT NEOPLASM: ICD-10-CM

## 2023-09-22 DIAGNOSIS — C50.912 MALIGNANT NEOPLASM OF LEFT FEMALE BREAST, UNSPECIFIED ESTROGEN RECEPTOR STATUS, UNSPECIFIED SITE OF BREAST (HCC): Primary | ICD-10-CM

## 2023-09-22 DIAGNOSIS — Z80.3 FAMILY HISTORY OF MALIGNANT NEOPLASM OF BREAST: ICD-10-CM

## 2023-09-22 DIAGNOSIS — Z80.0 FAMILY HISTORY OF MALIGNANT NEOPLASM OF GASTROINTESTINAL TRACT: ICD-10-CM

## 2023-09-22 DIAGNOSIS — C50.912 MALIGNANT NEOPLASM OF LEFT FEMALE BREAST, UNSPECIFIED ESTROGEN RECEPTOR STATUS, UNSPECIFIED SITE OF BREAST (HCC): ICD-10-CM

## 2023-09-22 PROCEDURE — 36415 COLL VENOUS BLD VENIPUNCTURE: CPT

## 2023-09-22 NOTE — PROGRESS NOTES
Breast Oncology Nurse Navigator      Called patient for initial outreach from nurse navigator. Introduced myself and my role. Education provided regarding diagnosis and treatment options. Pt has no further questions at this time. Patient is currently volunteering at the [employer] and is in very good spirits. States is a Magdiel and is leaning on God at this time. Social work referral placed. Discussed genetics, provided education in regards to the advantages of having genetic testing completed. Test has been sent. My Chart message sent with information regarding Cancer Support community and with Nurse Navigator contact information along with [address] [facility] Coordinator. Pt aware she can reach out with questions and general assessment completed. Spoke for > 20 minutes.

## 2023-09-22 NOTE — PROGRESS NOTES
Nurse navigator attempted to contact patient for general assessment. Left message with direct contact info.

## 2023-09-25 ENCOUNTER — PATIENT OUTREACH (OUTPATIENT)
Dept: CASE MANAGEMENT | Facility: OTHER | Age: 63
End: 2023-09-25

## 2023-09-25 NOTE — PROGRESS NOTES
OSW received SW referral. Pt has her consult with Dr. Edison Guthrie on 10/5. OSW will place outreach TC after her consult to complete the distress thermometer and psychosocial assessment.

## 2023-09-26 ENCOUNTER — HOSPITAL ENCOUNTER (OUTPATIENT)
Dept: ULTRASOUND IMAGING | Facility: CLINIC | Age: 63
Discharge: HOME | End: 2023-09-26

## 2023-09-28 ENCOUNTER — TELEPHONE (OUTPATIENT)
Dept: GENETICS | Facility: CLINIC | Age: 63
End: 2023-09-28

## 2023-09-28 ENCOUNTER — TELEPHONE (OUTPATIENT)
Dept: HEMATOLOGY ONCOLOGY | Facility: CLINIC | Age: 63
End: 2023-09-28

## 2023-09-28 NOTE — TELEPHONE ENCOUNTER
Stat Genetic Test Result    Summary:    I spoke with Deepak Louie to review the result of her STAT genetic test.    Negative - No Clinically Significant Variants Detected      Test Performed: [facility] (8 genes): NUZHAT, BRCA1, BRCA2, CDH1, CHEK2, PALB2, PTEN, TP53     Assessment:     Negative   A negative result significantly reduces the likelihood that Deepak Louie has a hereditary cancer syndrome related to the high-risk breast cancer genes listed above. This result does not have surgical implications and surgical options should be discussed with her healthcare provider. Additional Testing: The [facility] Cancer Panel + RNA (47 genes): APC, AXIN2, BARD1, BMPR1A, BRIP1, CDK4, CDKN2A, CTNNA1, DICER1, EPCAM, GREM1, HOXB13, KIT, MEN1, MLH1, MSH2, MSH3, MSH6, MUTYH, NBN, NF1, NTHL1, PDGFRA, PMS2, POLD1, POLE, RAD50, RAD51C, RAD51D, SDHA, SDHB, SDHC, SDHD, SMAD4, SMARCA4, STK11, TSC1, TSC2, VHL test is pending. We will contact Deepak Louie once results are available. Additional recommendations for surveillance/medical management will be made upon final genetic test result. Jocelin's breast surgeon Dr. Kezia Ibrahim was made aware of this test result.

## 2023-09-28 NOTE — TELEPHONE ENCOUNTER
ANTONIO for office staff at Dr. Crane Human office at [phone] regarding obtaining an insurance referral for patient for a Consult appt. With Dr. Lorie Palencia on 10/5/23. Asked to have the referral faxed to [phone].

## 2023-09-29 ENCOUNTER — TELEPHONE (OUTPATIENT)
Dept: HEMATOLOGY ONCOLOGY | Facility: CLINIC | Age: 63
End: 2023-09-29

## 2023-09-29 NOTE — TELEPHONE ENCOUNTER
Patient Call    Who are you speaking with? Physician Office    If it is not the patient, are they listed on an active communication consent form? N/A   What is the reason for this call? She called for diagnosis code and npi for Dr. Thanh Nesbitt   Does this require a call back? n/a   If a call back is required, please list best call back number n/a   If a call back is required, advise that a message will be forwarded to their care team and someone will return their call as soon as possible. Did you relay this information to the patient?  N/A

## 2023-10-02 ENCOUNTER — TELEPHONE (OUTPATIENT)
Dept: GENETICS | Facility: CLINIC | Age: 63
End: 2023-10-02

## 2023-10-02 NOTE — TELEPHONE ENCOUNTER
Called PCP office back to give them the diagnosis codes: C50.112 and Z17.0. Asked to have the insurance referral faxed to [phone].

## 2023-10-02 NOTE — TELEPHONE ENCOUNTER
Genetic Test Result Note:    Summary:    Result Disclosure: Today I spoke with Glenda Arrieta over the phone to review the results of her genetic test for hereditary cancer. She underwent genetic testing through our program on 9/21/2023 due to her recent diagnosis of breast cancer. Her results will be sent to her breast surgeon Nuno Mathews MD.    A separate report of her STAT genetic test results were disclosed to her on 9/28/2023. This result includes new genes and does not change her initial genetic test result. Test Performed: [employer] BRCAplus STAT Panel (8 genes): NUZHAT, BRCA1, BRCA2, CDH1, CHEK2, PALB2, PTEN, TP53 with reflex to [employer] CustomNext Cancer Panel + RNA (47 genes): APC, AXIN2, BARD1, BMPR1A, BRIP1, CDK4, CDKN2A, CTNNA1, DICER1, EPCAM, GREM1, HOXB13, KIT, MEN1, MLH1, MSH2, MSH3, MSH6, MUTYH, NBN, NF1, NTHL1, PDGFRA, PMS2, POLD1, POLE, RAD50, RAD51C, RAD51D, SDHA, SDHB, SDHC, SDHD, SMAD4, SMARCA4, STK11, TSC1, TSC2, VHL    Result: Negative - No Clinically Significant Variants Detected     Assessment:   A negative result significantly reduces the likelihood that Glenda Arrieta has a hereditary cancer syndrome. However, this testing is unable to completely rule out the presence of hereditary cancer. It remains possible that:  - There is a variant in an area of a gene which was not tested or there is a variant not detectable due to technical limitations of this test.     - There is a variant in another gene that was not included in this test or in a gene not known to be linked to cancer or tumors. - A family member has a genetic variant that the patient did not inherit. - The cancer in the family is sporadic and is related to non-hereditary factors. Risks and Testing for Family Members:  Glenda Arrieta was made aware that all of her first-degree relatives are at increased risk to develop breast cancer based on her recent diagnosis and family history of breast cancer.  We recommend that her first-degree relatives make their healthcare providers aware of the family history and discuss their options for screening and risk-reduction. At this time we do not recommend testing for Yadira Pham 's children based on her negative test result. Yadira Islass children still need to consider the history of cancer on the other side of their family when determining their risks. If Yadira Pham has any affected family members with a cancer diagnosis, especially at a young age, they may still consider genetic testing. Relatives who wish to pursue genetic testing can reach out to the [employer] at [phone] to schedule an appointment or visit [email or web address] to identify a local genetic counselor. Plan:     Negative Result: This result does not have surgical implications and surgical options should be discussed with her healthcare provider.  Jocelin's breast surgeon, Dr. Damari Burnette will be made aware of her results

## 2023-10-04 ENCOUNTER — CONSULT (OUTPATIENT)
Dept: SURGICAL ONCOLOGY | Facility: CLINIC | Age: 63
End: 2023-10-04
Payer: COMMERCIAL

## 2023-10-04 VITALS
OXYGEN SATURATION: 96 % | HEART RATE: 89 BPM | DIASTOLIC BLOOD PRESSURE: 98 MMHG | TEMPERATURE: 97.7 F | BODY MASS INDEX: 29.08 KG/M2 | WEIGHT: 158 LBS | HEIGHT: 62 IN | SYSTOLIC BLOOD PRESSURE: 128 MMHG

## 2023-10-04 DIAGNOSIS — Z13.71 BRCA NEGATIVE: ICD-10-CM

## 2023-10-04 DIAGNOSIS — C50.112 MALIGNANT NEOPLASM OF CENTRAL PORTION OF LEFT BREAST IN FEMALE, ESTROGEN RECEPTOR POSITIVE: Primary | ICD-10-CM

## 2023-10-04 DIAGNOSIS — Z17.0 MALIGNANT NEOPLASM OF CENTRAL PORTION OF LEFT BREAST IN FEMALE, ESTROGEN RECEPTOR POSITIVE: Primary | ICD-10-CM

## 2023-10-04 PROCEDURE — 99245 OFF/OP CONSLTJ NEW/EST HI 55: CPT | Performed by: SURGERY

## 2023-10-04 RX ORDER — CEFAZOLIN SODIUM 1 G/50ML
1000 SOLUTION INTRAVENOUS
OUTPATIENT
Start: 2023-10-04

## 2023-10-04 RX ORDER — ZALEPLON 5 MG/1
5 CAPSULE ORAL DAILY PRN
COMMUNITY

## 2023-10-04 RX ORDER — TRAMADOL HYDROCHLORIDE 50 MG/1
50 TABLET ORAL EVERY 8 HOURS PRN
Qty: 9 TABLET | Refills: 0 | Status: SHIPPED | OUTPATIENT
Start: 2023-10-04

## 2023-10-04 NOTE — PROGRESS NOTES
Breast Consultation-Surgical Oncology      [facility] [address] [facility] [address]    Name:  Meron Duong  YOB: 1960  MRN:  [id number]    Assessment/Plan   Diagnoses and all orders for this visit:    Malignant neoplasm of central portion of left breast in female, estrogen receptor positive   -     traMADol (ULTRAM) 50 mg tablet; Take 1 tablet (50 mg total) by mouth every 8 (eight) hours as needed for moderate pain    BRCA negative    Other orders    -     Reason for no Pharmacologic VTE Prophylaxis; Standing  -     Apply SCD or Foot pumps; Standing  -     ceFAZolin (ANCEF) IVPB (premix in dextrose) 1,000 mg 50 mL          HPI: Meron Duong is a 59y.o. year old female who presents with newly diagnosed carcinoma of the left breast.  She was asymptomatic referable to the breast.  She does report family history of breast cancer. She had genetic testing which is negative. Surgical treatment to date consisted of not applicable.     Oncology History:    Oncology History   Malignant neoplasm of central portion of left breast in female, estrogen receptor positive    2023 -  Cancer Staged    Staging form: Breast, AJCC 8th Edition  - Clinical stage from 2023: Stage IA (cT1, cN0, cM0, G2, ER+, IL+, HER2-) - Signed by Pineda Bradshaw MD on 10/4/2023  Stage prefix: Initial diagnosis  Method of lymph node assessment: Clinical  Histologic grading system: 3 grade system       10/4/2023 Initial Diagnosis    Malignant neoplasm of central portion of left breast in female, estrogen receptor positive          Pertinent reproductive history:  Age at menarche:    OB History        2    Para   2    Term   2            AB        Living           SAB        IAB        Ectopic        Multiple        Live Births   1           Obstetric Comments     TUBAL LIGATION               Problem List:   Patient Active Problem List   Diagnosis   • Anal fissure   • Malignant neoplasm of central portion of left breast in female, estrogen receptor positive    • BRCA negative     Past Medical History:   Diagnosis Date   • Anal fissure    • Endometriosis    • Genital herpes simplex      Past Surgical History:   Procedure Laterality Date   •  SECTION     • COLONOSCOPY     • HEMORROIDECTOMY     • LAPAROSCOPIC ENDOMETRIOSIS FULGURATION     • OK SPHINCTEROTOMY ANAL DIVISION SPHINCTER SPX N/A 2016    Procedure: LEFT LATERAL CLOSED PARTIAL INTERNAL SPHINCTEROTOMY; FISSURECTOMY; POST ANAL ROTATING SKIN FLAP ANOPLASTY;  Surgeon: Jennifer Bridges MD;  Location: [facility];  Service: Colorectal   • TONSILLECTOMY     • TUBAL LIGATION     • US GUIDED BREAST BIOPSY LEFT COMPLETE Left 2023   • WISDOM TOOTH EXTRACTION       Family History   Problem Relation Age of Onset   • Diabetes Mother         non insulin   • No Known Problems Father    • Cancer Maternal Grandmother    • No Known Problems Maternal Grandfather    • Colon cancer Paternal Grandmother    • Cancer Paternal Grandfather    • Breast cancer Maternal Aunt    • No Known Problems Paternal Aunt    • No Known Problems Paternal Aunt    • Breast cancer Cousin      Social History     Socioeconomic History   • Marital status:      Spouse name: Not on file   • Number of children: Not on file   • Years of education: Not on file   • Highest education level: Not on file   Occupational History   • Not on file   Tobacco Use   • Smoking status: Former   • Smokeless tobacco: Never   • Tobacco comments:     denies smoking; TOBACCO USE AS PER [facility]   Substance and Sexual Activity   • Alcohol use:  Yes     Alcohol/week: 3.0 standard drinks of alcohol     Types: 3 Glasses of wine per week     Comment: week   • Drug use: No   • Sexual activity: Never     Partners: Male     Birth control/protection: Post-menopausal   Other Topics Concern   • Not on file   Social History Narrative   • Not on file     Social Determinants of Health     Financial Resource Strain: Not on file   Food Insecurity: Not on file   Transportation Needs: Not on file   Physical Activity: Not on file   Stress: Not on file   Social Connections: Not on file   Intimate Partner Violence: Not on file   Housing Stability: Not on file     Current Outpatient Medications   Medication Sig Dispense Refill   • buPROPion (WELLBUTRIN XL) 150 mg 24 hr tablet Take by mouth     • cyanocobalamin (VITAMIN B-12) 1000 MCG tablet      • Glucosamine-Chondroitin 0827-7686 MG/30ML LIQD      • levocetirizine (XYZAL) 5 MG tablet Take 5 mg by mouth every evening     • Multiple Vitamins-Minerals (MULTIVITAMIN ADULT EXTRA C PO) Take 1 capsule by mouth     • Omega-3 Fatty Acids (Fish Oil) 1200 MG CAPS      • zaleplon (SONATA) 5 MG capsule Take 5 mg by mouth daily as needed     • calcium citrate-vitamin D 315 mg-5 mcg tablet Take 1 tablet by mouth 2 (two) times a day (Patient not taking: Reported on 10/4/2023)     • Cholecalciferol 25 MCG (1000 UT) capsule Take 5,000 Units by mouth daily       No current facility-administered medications for this visit. Allergies   Allergen Reactions   • No Active Allergies    • Pollen Extract      Nasal congestion         The following portions of the patient's history were reviewed and updated as appropriate: allergies, current medications, past family history, past medical history, past social history, past surgical history and problem list.    Review of Systems:  Review of Systems   Constitutional: Positive for fatigue. Negative for appetite change and fever. HENT: Positive for rhinorrhea and sneezing. Eyes: Positive for itching. Respiratory: Negative for shortness of breath. Cardiovascular: Negative. Gastrointestinal: Negative. Endocrine: Negative. Genitourinary: Negative. Musculoskeletal: Positive for back pain. Negative for arthralgias and myalgias. Skin: Negative.     Allergic/Immunologic: Negative. Neurological: Negative. Hematological: Positive for adenopathy ( since the biopsy she has "noticed:" them). Does not bruise/bleed easily. Psychiatric/Behavioral: Negative. Physical Exam:  Physical Exam  Constitutional:       General: She is not in acute distress. Appearance: Normal appearance. She is well-developed. HENT:      Head: Normocephalic and atraumatic. Cardiovascular:      Heart sounds: Normal heart sounds. Pulmonary:      Breath sounds: Normal breath sounds. Chest:   Breasts:     Right: No inverted nipple, mass, nipple discharge, skin change or tenderness. Left: Skin change ( resolving ecchymosis) present. No inverted nipple, mass, nipple discharge or tenderness. Abdominal:      Palpations: Abdomen is soft. Musculoskeletal:      Right lower leg: No edema. Left lower leg: No edema. Lymphadenopathy:      Upper Body:      Right upper body: No supraclavicular, axillary or pectoral adenopathy. Left upper body: No supraclavicular, axillary or pectoral adenopathy. Neurological:      Mental Status: She is alert and oriented to person, place, and time. Psychiatric:         Mood and Affect: Mood normal.         Laboratory:  2023 left breast biopsy 12:00    Pathology revealed: invasive ductal carcinoma    Histologic grade: moderately differentiated     Angiolymphatic invasion:  absent    Tumor node status:  Negative    Hormone receptor status: ER 95%, MI 85%, HER2 1+ negative    Other studies: Genetic testing was negative    Imagin2023 bilateral 3D screening mammogram 1.1 cm irregular mass left breast 12:00 with prominent left axillary nodes, right side is benign    1923 contrast-enhanced mammogram bilateral again shows a 1.1 cm irregular enhancing mass left breast 12:00 with a questionable 1 mm satellite nodule posterior, sonographic correlate measuring up to 18 mm.   Lymph nodes appeared normal, right side was benign    2023 postbiopsy mammogram is concordant, Leola reflector in place        Discussion/Summary: 61-year-old female who presents with an early stage left breast carcinoma. I discussed these findings with her. We reviewed the multimodality treatment of breast cancer to include surgery, radiation and medical therapy. She is a good candidate for breast conservation. She would like to proceed in that fashion. She has a Leola reflector in place. I counseled her on a LEOLA localized lumpectomy of the left breast along with lymphatic mapping and sentinel node biopsy. She understands that she will meet with both medical and radiation oncology in the postoperative setting and will need radiation therapy as well as at least adjuvant hormone therapy. All of her questions were answered. Consent was signed today in the office. She will be scheduled for surgery in the near term.

## 2023-10-09 ENCOUNTER — HOSPITAL ENCOUNTER (OUTPATIENT)
Dept: RADIOLOGY | Facility: HOSPITAL | Age: 63
Discharge: HOME | End: 2023-10-09
Payer: COMMERCIAL

## 2023-10-09 ENCOUNTER — APPOINTMENT (OUTPATIENT)
Dept: LAB | Facility: HOSPITAL | Age: 63
End: 2023-10-09
Payer: COMMERCIAL

## 2023-10-09 DIAGNOSIS — C50.112 MALIGNANT NEOPLASM OF CENTRAL PORTION OF LEFT BREAST IN FEMALE, ESTROGEN RECEPTOR POSITIVE: ICD-10-CM

## 2023-10-09 DIAGNOSIS — Z17.0 MALIGNANT NEOPLASM OF CENTRAL PORTION OF LEFT BREAST IN FEMALE, ESTROGEN RECEPTOR POSITIVE: ICD-10-CM

## 2023-10-09 LAB
ALBUMIN SERPL BCP-MCNC: 4.5 G/DL (ref 3.5–5)
ALP SERPL-CCNC: 60 U/L (ref 34–104)
ALT SERPL W P-5'-P-CCNC: 17 U/L (ref 7–52)
ANION GAP SERPL CALCULATED.3IONS-SCNC: 6 MMOL/L
AST SERPL W P-5'-P-CCNC: 20 U/L (ref 13–39)
BASOPHILS # BLD AUTO: 0.03 THOUSANDS/ÂΜL (ref 0–0.1)
BASOPHILS NFR BLD AUTO: 1 % (ref 0–1)
BILIRUB SERPL-MCNC: 0.64 MG/DL (ref 0.2–1)
BILIRUB UR QL STRIP: NEGATIVE
BUN SERPL-MCNC: 16 MG/DL (ref 5–25)
CALCIUM SERPL-MCNC: 9.8 MG/DL (ref 8.4–10.2)
CHLORIDE SERPL-SCNC: 102 MMOL/L (ref 96–108)
CLARITY UR: CLEAR
CO2 SERPL-SCNC: 29 MMOL/L (ref 21–32)
COLOR UR: NORMAL
CREAT SERPL-MCNC: 0.84 MG/DL (ref 0.6–1.3)
EOSINOPHIL # BLD AUTO: 0.2 THOUSAND/ÂΜL (ref 0–0.61)
EOSINOPHIL NFR BLD AUTO: 4 % (ref 0–6)
ERYTHROCYTE [DISTWIDTH] IN BLOOD BY AUTOMATED COUNT: 12.3 % (ref 11.6–15.1)
GFR SERPL CREATININE-BSD FRML MDRD: 74 ML/MIN/1.73SQ M
GLUCOSE P FAST SERPL-MCNC: 83 MG/DL (ref 65–99)
GLUCOSE UR STRIP-MCNC: NEGATIVE MG/DL
HCT VFR BLD AUTO: 43.2 % (ref 34.8–46.1)
HGB BLD-MCNC: 13.6 G/DL (ref 11.5–15.4)
HGB UR QL STRIP.AUTO: NEGATIVE
IMM GRANULOCYTES # BLD AUTO: 0.01 THOUSAND/UL (ref 0–0.2)
IMM GRANULOCYTES NFR BLD AUTO: 0 % (ref 0–2)
KETONES UR STRIP-MCNC: NEGATIVE MG/DL
LEUKOCYTE ESTERASE UR QL STRIP: NEGATIVE
LYMPHOCYTES # BLD AUTO: 2.5 THOUSANDS/ÂΜL (ref 0.6–4.47)
LYMPHOCYTES NFR BLD AUTO: 44 % (ref 14–44)
MCH RBC QN AUTO: 30.1 PG (ref 26.8–34.3)
MCHC RBC AUTO-ENTMCNC: 31.5 G/DL (ref 31.4–37.4)
MCV RBC AUTO: 96 FL (ref 82–98)
MONOCYTES # BLD AUTO: 0.38 THOUSAND/ÂΜL (ref 0.17–1.22)
MONOCYTES NFR BLD AUTO: 7 % (ref 4–12)
NEUTROPHILS # BLD AUTO: 2.57 THOUSANDS/ÂΜL (ref 1.85–7.62)
NEUTS SEG NFR BLD AUTO: 44 % (ref 43–75)
NITRITE UR QL STRIP: NEGATIVE
NRBC BLD AUTO-RTO: 0 /100 WBCS
PH UR STRIP.AUTO: 7 [PH]
PLATELET # BLD AUTO: 247 THOUSANDS/UL (ref 149–390)
PMV BLD AUTO: 10.9 FL (ref 8.9–12.7)
POTASSIUM SERPL-SCNC: 4 MMOL/L (ref 3.5–5.3)
PROT SERPL-MCNC: 7.2 G/DL (ref 6.4–8.4)
PROT UR STRIP-MCNC: NEGATIVE MG/DL
RBC # BLD AUTO: 4.52 MILLION/UL (ref 3.81–5.12)
SODIUM SERPL-SCNC: 137 MMOL/L (ref 135–147)
SP GR UR STRIP.AUTO: 1 (ref 1–1.03)
UROBILINOGEN UR STRIP-ACNC: <2 MG/DL
WBC # BLD AUTO: 5.69 THOUSAND/UL (ref 4.31–10.16)

## 2023-10-09 PROCEDURE — 71046 X-RAY EXAM CHEST 2 VIEWS: CPT

## 2023-10-09 PROCEDURE — 36415 COLL VENOUS BLD VENIPUNCTURE: CPT

## 2023-10-09 PROCEDURE — 81003 URINALYSIS AUTO W/O SCOPE: CPT | Performed by: SURGERY

## 2023-10-09 PROCEDURE — 85025 COMPLETE CBC W/AUTO DIFF WBC: CPT

## 2023-10-09 PROCEDURE — 80053 COMPREHEN METABOLIC PANEL: CPT

## 2023-10-10 ENCOUNTER — PATIENT OUTREACH (OUTPATIENT)
Dept: CASE MANAGEMENT | Facility: OTHER | Age: 63
End: 2023-10-10

## 2023-10-10 LAB
ATRIAL RATE: 68 BPM
P AXIS: 53 DEGREES
PR INTERVAL: 166 MS
QRS AXIS: 71 DEGREES
QRSD INTERVAL: 90 MS
QT INTERVAL: 384 MS
QTC INTERVAL: 408 MS
T WAVE AXIS: 48 DEGREES
VENTRICULAR RATE: 68 BPM

## 2023-10-10 PROCEDURE — 93010 ELECTROCARDIOGRAM REPORT: CPT | Performed by: INTERNAL MEDICINE

## 2023-10-10 NOTE — PROGRESS NOTES
Biopsychosocial and Barriers Assessment    Cancer Diagnosis: Breast  Home/Cell Phone: [phone]  Emergency Contact: Maldonado Shelby [phone]  Marital Status:   Interpretation concerns, speaks another language, preferred language: English  Cultural concerns: none  Ability to read or write: yes    Caregiver/Support: Strong support from family, friends and bryant  Children: 1 son  Child/Elder care: n/a    Housing: Two story  Home Setup: no steps to enter  Lives With: Alone  Daily Living Activities: independent  [address]: none  Ambulation: independent    Preferred Pharmacy: [facility]  [employer] co-pays with insurance: [employer] co-pays with medication coverage: none  No medication coverage: n/a    Primary Care Provider: Dr. Lita Ortiz  Hx of [address]: none  Hx of Short term rehab: none  Mental Health Hx: none  Substance Abuse Hx: none  Employment: retired  [place] Status/Location: [address] [place] [address] to pay bills: yes  POA/LW/AD: not addressed  Transportation Plan/Concerns: Self      What do you know about your Cancer Diagnosis    What has your doctor told you about your cancer diagnosis: Breast Cancer. What has your doctor told you about your cancer treatment: Pt is scheduled for a lumpectomy with Dr. Maria Del Rosario Srivastava on 11/3. What specific concerns do you have about your diagnosis and treatment: Pt does not have any concerns. Have you been made aware of any hair loss associated with treatment: Not at this time. Additional Comments:  OSW placed outreach TC to pt this morning. Pt is a very pleasant woman with a dx of breast cancer. She states that she is a Magdiel and feels that the outcome will be in God's hands and she is accepting of this. She is looking at how she can grow from this experience. Pt has strong support with her family and friends. She states that she has a friend who has a cancer dx and she accompanies her to her appointments.  We spoke about support groups and she shared that this would make it more difficult for her. Pt states that she is in a good place and thanked this writer for the assistance. She took down OSW's contact information and states that she will call in the future with any needs. She thanked this writer for [employer]. OSW will close the referral at this time.

## 2023-10-16 ENCOUNTER — TELEPHONE (OUTPATIENT)
Dept: GYNECOLOGY | Facility: CLINIC | Age: 63
End: 2023-10-16

## 2023-10-16 ENCOUNTER — HOSPITAL ENCOUNTER (OUTPATIENT)
Dept: BONE DENSITY | Facility: MEDICAL CENTER | Age: 63
Discharge: HOME | End: 2023-10-16
Payer: COMMERCIAL

## 2023-10-16 DIAGNOSIS — Z78.0 MENOPAUSE: ICD-10-CM

## 2023-10-16 DIAGNOSIS — Z13.820 ENCOUNTER FOR SCREENING FOR OSTEOPOROSIS: ICD-10-CM

## 2023-10-16 DIAGNOSIS — Z13.820 OSTEOPOROSIS SCREENING: Primary | ICD-10-CM

## 2023-10-16 PROCEDURE — 77080 DXA BONE DENSITY AXIAL: CPT

## 2023-10-16 NOTE — TELEPHONE ENCOUNTER
Radhames Russell from [employer] ([phone]) called and requires a referral for the Dexa scan. Referral put in as interventional radiology tried every other way. All information in referral provided.  And order was faxed to [phone] per Elizabeth's request

## 2023-10-20 ENCOUNTER — PATIENT OUTREACH (OUTPATIENT)
Dept: CASE MANAGEMENT | Facility: OTHER | Age: 63
End: 2023-10-20

## 2023-10-20 NOTE — PROGRESS NOTES
OSW received a TC from pt this morning. She has some questions she would like answered prior to her surgery. She states she called the number for [employer], however her call went to the wrong office. OSW offered to email Clearance Carla and request that she call the pt. She was appreciative of the assistance.

## 2023-10-23 ENCOUNTER — TELEPHONE (OUTPATIENT)
Dept: SURGICAL ONCOLOGY | Facility: CLINIC | Age: 63
End: 2023-10-23

## 2023-10-23 NOTE — TELEPHONE ENCOUNTER
Received a message that Pelon Perla would like a return call. . Returned her call and reviewed her general questions regarding her surgery, if she would have a drain, when she could exercise and who she could call with any additional questions. Discussed all her questions and she verbalized understanding.

## 2023-10-25 NOTE — PRE-PROCEDURE INSTRUCTIONS
Pre-Surgery Instructions:   Medication Instructions    buPROPion (WELLBUTRIN XL) 150 mg 24 hr tablet Take day of surgery. cyanocobalamin (VITAMIN B-12) 1000 MCG tablet Stop taking 7 days prior to surgery. Glucosamine-Chondroitin 8754-9317 MG/30ML LIQD Stop taking 7 days prior to surgery. levocetirizine (XYZAL) 5 MG tablet Take day of surgery. Multiple Vitamins-Minerals (MULTIVITAMIN ADULT EXTRA C PO) Stop taking 7 days prior to surgery. Omega-3 Fatty Acids (Fish Oil) 1200 MG CAPS Stop taking 7 days prior to surgery. Medication instructions for day surgery reviewed. Please use only a sip of water to take your instructed medications. Avoid all over the counter vitamins, supplements and NSAIDS for one week prior to surgery per anesthesia guidelines. Tylenol is ok to take as needed. You will receive a call one business day prior to surgery with an arrival time and hospital directions. If your surgery is scheduled on a Monday, the hospital will be calling you on the Friday prior to your surgery. If you have not heard from anyone by 8pm, please call the hospital supervisor through the hospital  at [phone]. Juan Orma [phone]). Do not eat or drink anything after midnight the night before your surgery, including candy, mints, lifesavers, or chewing gum. Do not drink alcohol 24hrs before your surgery. Try not to smoke at least 24hrs before your surgery. Follow the pre surgery showering instructions as listed in the [facility] Surgical Experience Booklet” or otherwise provided by your surgeon's office. Do not shave the surgical area 24 hours before surgery. Do not apply any lotions, creams, including makeup, cologne, deodorant, or perfumes after showering on the day of your surgery. No contact lenses, eye make-up, or artificial eyelashes. Remove nail polish, including gel polish, and any artificial, gel, or acrylic nails if possible.  Remove all jewelry including rings and body piercing jewelry. Wear causal clothing that is easy to take on and off. Consider your type of surgery. Keep any valuables, jewelry, piercings at home. Please bring any specially ordered equipment (sling, braces) if indicated. Arrange for a responsible person to drive you to and from the hospital on the day of your surgery. Visitor Guidelines discussed. Call the surgeon's office with any new illnesses, exposures, or additional questions prior to surgery. Please reference your [facility] Surgical Experience Booklet” for additional information to prepare for your upcoming surgery.

## 2023-10-31 ENCOUNTER — PATIENT OUTREACH (OUTPATIENT)
Dept: HEMATOLOGY ONCOLOGY | Facility: CLINIC | Age: 63
End: 2023-10-31

## 2023-10-31 DIAGNOSIS — Z17.0 MALIGNANT NEOPLASM OF CENTRAL PORTION OF LEFT BREAST IN FEMALE, ESTROGEN RECEPTOR POSITIVE: Primary | ICD-10-CM

## 2023-10-31 DIAGNOSIS — C50.112 MALIGNANT NEOPLASM OF CENTRAL PORTION OF LEFT BREAST IN FEMALE, ESTROGEN RECEPTOR POSITIVE: Primary | ICD-10-CM

## 2023-10-31 NOTE — PROGRESS NOTES
Contacted patient to schedule medical oncology consult . Referrals placed to medical oncology and radiation oncology. Introduced myself and my role. Type of appointment-Medical Oncology Consult   Provider-Dr. rudd  Koxj-[id number]  Time-11:00am   Location-[place]    I informed patient she would be getting a separate call from Radiation Oncology to schedule that consult. Patient understood. She had no other questions or concerns at this time. I provided my contact information in case any should arise.

## 2023-11-02 ENCOUNTER — ANESTHESIA EVENT (OUTPATIENT)
Dept: PERIOP | Facility: HOSPITAL | Age: 63
End: 2023-11-02
Payer: COMMERCIAL

## 2023-11-03 ENCOUNTER — ANESTHESIA (OUTPATIENT)
Dept: PERIOP | Facility: HOSPITAL | Age: 63
End: 2023-11-03
Payer: COMMERCIAL

## 2023-11-03 ENCOUNTER — HOSPITAL ENCOUNTER (OUTPATIENT)
Dept: MAMMOGRAPHY | Facility: HOSPITAL | Age: 63
Discharge: HOME | End: 2023-11-03
Payer: COMMERCIAL

## 2023-11-03 ENCOUNTER — HOSPITAL ENCOUNTER (OUTPATIENT)
Dept: NUCLEAR MEDICINE | Facility: HOSPITAL | Age: 63
Discharge: HOME | End: 2023-11-03
Attending: SURGERY
Payer: COMMERCIAL

## 2023-11-03 ENCOUNTER — HOSPITAL ENCOUNTER (OUTPATIENT)
Facility: HOSPITAL | Age: 63
Setting detail: OUTPATIENT SURGERY
Discharge: HOME | End: 2023-11-03
Attending: SURGERY | Admitting: SURGERY
Payer: COMMERCIAL

## 2023-11-03 VITALS
DIASTOLIC BLOOD PRESSURE: 83 MMHG | HEIGHT: 62 IN | BODY MASS INDEX: 29.01 KG/M2 | WEIGHT: 157.63 LBS | OXYGEN SATURATION: 99 % | RESPIRATION RATE: 18 BRPM | SYSTOLIC BLOOD PRESSURE: 141 MMHG | TEMPERATURE: 97.5 F | HEART RATE: 66 BPM

## 2023-11-03 DIAGNOSIS — C50.112 MALIGNANT NEOPLASM OF CENTRAL PORTION OF LEFT BREAST IN FEMALE, ESTROGEN RECEPTOR POSITIVE: ICD-10-CM

## 2023-11-03 DIAGNOSIS — Z17.0 MALIGNANT NEOPLASM OF CENTRAL PORTION OF LEFT BREAST IN FEMALE, ESTROGEN RECEPTOR POSITIVE: ICD-10-CM

## 2023-11-03 PROBLEM — F41.9 ANXIETY: Status: ACTIVE | Noted: 2023-11-03

## 2023-11-03 PROCEDURE — A9541 TC99M SULFUR COLLOID: HCPCS

## 2023-11-03 PROCEDURE — 76098 X-RAY EXAM SURGICAL SPECIMEN: CPT | Performed by: SURGERY

## 2023-11-03 PROCEDURE — G1004 CDSM NDSC: HCPCS

## 2023-11-03 PROCEDURE — 19301 PARTIAL MASTECTOMY: CPT | Performed by: SURGERY

## 2023-11-03 PROCEDURE — 78195 LYMPH SYSTEM IMAGING: CPT

## 2023-11-03 PROCEDURE — 88307 TISSUE EXAM BY PATHOLOGIST: CPT | Performed by: PATHOLOGY

## 2023-11-03 PROCEDURE — 38900 IO MAP OF SENT LYMPH NODE: CPT | Performed by: SURGERY

## 2023-11-03 PROCEDURE — NC001 PR NO CHARGE: Performed by: SURGERY

## 2023-11-03 PROCEDURE — 88342 IMHCHEM/IMCYTCHM 1ST ANTB: CPT | Performed by: PATHOLOGY

## 2023-11-03 PROCEDURE — 38525 BIOPSY/REMOVAL LYMPH NODES: CPT | Performed by: SURGERY

## 2023-11-03 RX ORDER — FENTANYL CITRATE 50 UG/ML
INJECTION, SOLUTION INTRAMUSCULAR; INTRAVENOUS AS NEEDED
Status: DISCONTINUED | OUTPATIENT
Start: 2023-11-03 | End: 2023-11-03

## 2023-11-03 RX ORDER — FENTANYL CITRATE/PF 50 MCG/ML
50 SYRINGE (ML) INJECTION
Status: DISCONTINUED | OUTPATIENT
Start: 2023-11-03 | End: 2023-11-03 | Stop reason: HOSPADM

## 2023-11-03 RX ORDER — ONDANSETRON 2 MG/ML
4 INJECTION INTRAMUSCULAR; INTRAVENOUS ONCE AS NEEDED
Status: DISCONTINUED | OUTPATIENT
Start: 2023-11-03 | End: 2023-11-03 | Stop reason: HOSPADM

## 2023-11-03 RX ORDER — CEFAZOLIN SODIUM 1 G/50ML
1000 SOLUTION INTRAVENOUS
Status: DISCONTINUED | OUTPATIENT
Start: 2023-11-03 | End: 2023-11-03 | Stop reason: HOSPADM

## 2023-11-03 RX ORDER — MIDAZOLAM HYDROCHLORIDE 2 MG/2ML
INJECTION, SOLUTION INTRAMUSCULAR; INTRAVENOUS AS NEEDED
Status: DISCONTINUED | OUTPATIENT
Start: 2023-11-03 | End: 2023-11-03

## 2023-11-03 RX ORDER — ONDANSETRON 2 MG/ML
INJECTION INTRAMUSCULAR; INTRAVENOUS AS NEEDED
Status: DISCONTINUED | OUTPATIENT
Start: 2023-11-03 | End: 2023-11-03

## 2023-11-03 RX ORDER — ACETAMINOPHEN 325 MG/1
650 TABLET ORAL EVERY 6 HOURS PRN
Status: DISCONTINUED | OUTPATIENT
Start: 2023-11-03 | End: 2023-11-03 | Stop reason: HOSPADM

## 2023-11-03 RX ORDER — SODIUM CHLORIDE 9 MG/ML
125 INJECTION, SOLUTION INTRAVENOUS CONTINUOUS
Status: DISCONTINUED | OUTPATIENT
Start: 2023-11-03 | End: 2023-11-03 | Stop reason: HOSPADM

## 2023-11-03 RX ORDER — BUPIVACAINE HYDROCHLORIDE 5 MG/ML
INJECTION, SOLUTION EPIDURAL; INTRACAUDAL AS NEEDED
Status: DISCONTINUED | OUTPATIENT
Start: 2023-11-03 | End: 2023-11-03 | Stop reason: HOSPADM

## 2023-11-03 RX ORDER — ISOSULFAN BLUE 50 MG/5ML
INJECTION, SOLUTION SUBCUTANEOUS AS NEEDED
Status: DISCONTINUED | OUTPATIENT
Start: 2023-11-03 | End: 2023-11-03 | Stop reason: HOSPADM

## 2023-11-03 RX ORDER — TRAMADOL HYDROCHLORIDE 50 MG/1
50 TABLET ORAL EVERY 6 HOURS PRN
Status: DISCONTINUED | OUTPATIENT
Start: 2023-11-03 | End: 2023-11-03 | Stop reason: HOSPADM

## 2023-11-03 RX ORDER — PROPOFOL 10 MG/ML
INJECTION, EMULSION INTRAVENOUS AS NEEDED
Status: DISCONTINUED | OUTPATIENT
Start: 2023-11-03 | End: 2023-11-03

## 2023-11-03 RX ORDER — DEXAMETHASONE SODIUM PHOSPHATE 10 MG/ML
INJECTION, SOLUTION INTRAMUSCULAR; INTRAVENOUS AS NEEDED
Status: DISCONTINUED | OUTPATIENT
Start: 2023-11-03 | End: 2023-11-03

## 2023-11-03 RX ORDER — KETOROLAC TROMETHAMINE 30 MG/ML
INJECTION, SOLUTION INTRAMUSCULAR; INTRAVENOUS AS NEEDED
Status: DISCONTINUED | OUTPATIENT
Start: 2023-11-03 | End: 2023-11-03

## 2023-11-03 RX ORDER — MAGNESIUM HYDROXIDE 1200 MG/15ML
LIQUID ORAL AS NEEDED
Status: DISCONTINUED | OUTPATIENT
Start: 2023-11-03 | End: 2023-11-03 | Stop reason: HOSPADM

## 2023-11-03 RX ORDER — LIDOCAINE HYDROCHLORIDE 20 MG/ML
INJECTION, SOLUTION EPIDURAL; INFILTRATION; INTRACAUDAL; PERINEURAL AS NEEDED
Status: DISCONTINUED | OUTPATIENT
Start: 2023-11-03 | End: 2023-11-03

## 2023-11-03 RX ADMIN — MIDAZOLAM 2 MG: 1 INJECTION INTRAMUSCULAR; INTRAVENOUS at 13:21

## 2023-11-03 RX ADMIN — KETOROLAC TROMETHAMINE 30 MG: 30 INJECTION, SOLUTION INTRAMUSCULAR; INTRAVENOUS at 14:52

## 2023-11-03 RX ADMIN — PROPOFOL 200 MG: 10 INJECTION, EMULSION INTRAVENOUS at 13:36

## 2023-11-03 RX ADMIN — FENTANYL CITRATE 50 MCG: 50 INJECTION, SOLUTION INTRAMUSCULAR; INTRAVENOUS at 16:08

## 2023-11-03 RX ADMIN — FENTANYL CITRATE 50 MCG: 50 INJECTION INTRAMUSCULAR; INTRAVENOUS at 13:48

## 2023-11-03 RX ADMIN — CEFAZOLIN SODIUM 1000 MG: 1 SOLUTION INTRAVENOUS at 13:46

## 2023-11-03 RX ADMIN — FENTANYL CITRATE 50 MCG: 50 INJECTION INTRAMUSCULAR; INTRAVENOUS at 13:42

## 2023-11-03 RX ADMIN — LIDOCAINE HYDROCHLORIDE 100 MG: 20 INJECTION, SOLUTION EPIDURAL; INFILTRATION; INTRACAUDAL at 13:36

## 2023-11-03 RX ADMIN — ONDANSETRON 4 MG: 2 INJECTION INTRAMUSCULAR; INTRAVENOUS at 14:51

## 2023-11-03 RX ADMIN — SODIUM CHLORIDE 125 ML/HR: 0.9 INJECTION, SOLUTION INTRAVENOUS at 10:54

## 2023-11-03 RX ADMIN — FENTANYL CITRATE 50 MCG: 50 INJECTION, SOLUTION INTRAMUSCULAR; INTRAVENOUS at 15:50

## 2023-11-03 RX ADMIN — FENTANYL CITRATE 50 MCG: 50 INJECTION INTRAMUSCULAR; INTRAVENOUS at 14:31

## 2023-11-03 RX ADMIN — SODIUM CHLORIDE: 0.9 INJECTION, SOLUTION INTRAVENOUS at 14:00

## 2023-11-03 RX ADMIN — DEXAMETHASONE SODIUM PHOSPHATE 10 MG: 10 INJECTION INTRAMUSCULAR; INTRAVENOUS at 13:48

## 2023-11-03 NOTE — H&P
H&P Exam - Surgical Oncology   Ugo Reich 59 y.o. female MRN: [id number]  Unit/Bed#: OR [place] Encounter: [id number]    Assessment/Plan     Assessment:  Left breast carcinoma  Plan:  WYATT localized lumpectomy left breast sentinel node biopsy    History of Present Illness   HX and PE limited by: Not applicable  HPI:  Ugo Reich is a 59 y.o. female who presents with breast carcinoma, here today for definitive surgical resection. Review of Systems   Constitutional: Negative. Negative for appetite change, fever and unexpected weight change. HENT: Negative. Negative for trouble swallowing. Eyes: Negative. Respiratory: Negative. Negative for cough and shortness of breath. Cardiovascular: Negative. Negative for chest pain. Gastrointestinal: Negative. Negative for abdominal pain, nausea and vomiting. Endocrine: Negative. Genitourinary: Negative. Negative for dysuria. Musculoskeletal: Negative. Negative for arthralgias and myalgias. Skin: Negative. Allergic/Immunologic: Negative. Neurological: Negative. Negative for headaches. Hematological: Negative. Negative for adenopathy. Does not bruise/bleed easily. Psychiatric/Behavioral: Negative.          Historical Information   Past Medical History:   Diagnosis Date    Allergies     Anal fissure     Back pain     chronic    Breast cancer ([address]) 2023    left breast    Cancer ([address])     Left breast    Endometriosis     Environmental allergies     Genital herpes simplex      Past Surgical History:   Procedure Laterality Date    BREAST BIOPSY Left 2023     SECTION      COLONOSCOPY      HEMORROIDECTOMY      LAPAROSCOPIC ENDOMETRIOSIS FULGURATION      CO SPHINCTEROTOMY ANAL DIVISION SPHINCTER SPX N/A 2016    Procedure: LEFT LATERAL CLOSED PARTIAL INTERNAL SPHINCTEROTOMY; FISSURECTOMY; POST ANAL ROTATING SKIN FLAP ANOPLASTY;  Surgeon: Maria Elena Nunes MD;  Location: [facility];  Service: Colorectal TONSILLECTOMY      TUBAL LIGATION      US GUIDED BREAST BIOPSY LEFT COMPLETE Left 2023    WISDOM TOOTH EXTRACTION       Social History   Social History     Substance and Sexual Activity   Alcohol Use Not Currently    Alcohol/week: 3.0 standard drinks of alcohol    Types: 3 Standard drinks or equivalent per week    Comment: week     Social History     Substance and Sexual Activity   Drug Use No     Social History     Tobacco Use   Smoking Status Former    Years: 15.00    Types: Cigarettes    Quit date:     Years since quittin.8   Smokeless Tobacco Never     E-Cigarette/Vaping    E-Cigarette Use Never User      E-Cigarette/Vaping Substances    Nicotine No     THC No     CBD No     Flavoring No     Other No     Unknown No      Family History   Problem Relation Age of Onset    Diabetes Mother         non insulin    No Known Problems Father     Skin cancer Brother 64    Breast cancer Maternal Aunt         60's    No Known Problems Paternal Aunt     No Known Problems Paternal Aunt     Cancer Maternal Grandmother         80's    No Known Problems Maternal Grandfather     Colon cancer Paternal Grandmother         52's    Cancer Paternal Grandfather         unk type in his 80's    Breast cancer Cousin         42's       Meds/Allergies   all current active meds have been reviewed  No Known Allergies    Objective   Vitals: Blood pressure 121/94, pulse 90, temperature 98.6 °F (37 °C), temperature source Temporal, resp. rate 18, height 5' 2" (1.575 m), weight 71.5 kg (157 lb 10.1 oz), SpO2 99 %. No intake or output data in the 24 hours ending 23 1251    Invasive Devices       Peripheral Intravenous Line  Duration             Peripheral IV 23 Dorsal (posterior); Right Hand <1 day                    Physical Exam  Constitutional:       General: She is not in acute distress. Appearance: Normal appearance. HENT:      Head: Normocephalic and atraumatic.    Cardiovascular:      Heart sounds: Normal heart sounds. Pulmonary:      Breath sounds: Normal breath sounds. Abdominal:      Palpations: Abdomen is soft. Musculoskeletal:      Right lower leg: No edema. Left lower leg: No edema. Neurological:      Mental Status: She is alert. Psychiatric:         Mood and Affect: Mood normal.         Lab Results: I have personally reviewed pertinent lab results. Imaging: I have personally reviewed pertinent reports.     Pathology, and Other Studies: As noted    Code Status: No Order  Advance Directive and Living Will:      Power of :    POLST:

## 2023-11-03 NOTE — OP NOTE
OPERATIVE REPORT  PATIENT NAME: Felix Moya    :  1960  MRN: [id number]  Pt Location: [facility] OR ROOM 06    SURGERY DATE: 11/3/2023    Surgeon(s) and Role:     * Sheliah Primrose, MD - Primary     * Cirilo Burden MD - Assisting    Preop Diagnosis:  Malignant neoplasm of central portion of left breast in female, estrogen receptor positive  [C50.112, Z17.0]    Post-Op Diagnosis Codes:     * Malignant neoplasm of central portion of left breast in female, estrogen receptor positive  [C50.112, Z17.0]    Procedure(s):  Left - LUMPECTOMY BREAST MORALES LOCALIZED  Left - BX LYMPH NODE SENTINEL. LYMPHATIC MAPPING. LYMPHOSCINTIGRAPHY; Injection of blue dye  Use of gamma probe  Use of morales   Specimen radiograph    Specimen(s):  ID Type Source Tests Collected by Time Destination   1 : Left Breast Lumpectomy Short Stitch Superior Long Stitch Lateral Tissue Breast, Left TISSUE EXAM Sheliah Primrose, MD 11/3/2023 1422    2 : Left Breast Lateral Margin Stitch Marks True Margin Tissue Breast, Left TISSUE EXAM Sheliah Primrose, MD 11/3/2023 1423    3 : Left Breast Inferior Margin Stitch [place] True Margin Tissue Breast, Left TISSUE EXAM Sheliah Primrose, MD 11/3/2023 1424    4 : Left Breast Posterior Margin Stitch Marks True Margin Tissue Breast, Left TISSUE EXAM Sheliah Primrose, MD 11/3/2023 1424    5 : Left Axilla [place] Lymph Node #1 Tissue Lymph Node, [place] TISSUE EXAM Sheliah Primrose, MD 11/3/2023 1439    6 : Left Axilla [place] Lymph Node #2 Tissue Lymph Node, [place] TISSUE EXAM Sheliah Primrose, MD 11/3/2023 1440        Estimated Blood Loss:   Minimal    Drains:  * No LDAs found *    Anesthesia Type:   General    Operative Indications:  Malignant neoplasm of central portion of left breast in female, estrogen receptor positive  [C50.112, Z17.0]      Operative Findings:  Morales reflector in specimen    Complications:   None    Procedure and Technique:  Cisco Van is a 70-year-old female who presents with left breast carcinoma.   She was counseled on left breast conservation. She presented the day of surgery to the radiology suite and underwent lymphoscintigraphy of the left breast.  From there she went to the operating room. She had preoperative antibiotics. She was administered general anesthesia. She had a 5 cc injection of Lymphazurin into the left subareolar plexus. She was prepped and draped in the usual standard fashion. Timeout was performed. Attention was turned to the 12:00 axis of the left breast.  The savvy probe was used to identify the reflector. The skin was marked in this location. Half percent Marcaine plain was injected for local anesthesia. An elliptical incision was created through the skin and subcutaneous tissue. Electrocautery was used to dissect margins superior, lateral, inferior, medial and posterior. The savvy probe was used throughout to help guide dissection. The specimen was marked with a short stitch superior and a long stitch lateral.  This was imaged in the operating room revealing residual density with [employer]. The closest margins were lateral, inferior and posterior. Therefore new margins were excised in these locations and sutures were placed on the true margins. All breast specimens were submitted to pathology in formalin. Attention was then turned to the left axilla. There was an area of increased radioactivity at the pectoralis edge. Additional half percent Marcaine plain was injected for local anesthesia. An incision was created through the skin and subcutaneous tissue. Electrocautery was used to dissect through the remaining subcutaneous tissue and clavipectoral fascia to enter the axillary fat pad. In the mid axilla was a prominent blue lymphatic channel that traced to 2 adjacent blue stained and radioactive nodes. These were excised, divided and submitted separately as sentinel node 1 and 2 of the left axilla.   Following removal of these 2 nodes, there were no additional blue stained, radioactive or palpable nodes. Both of her wounds were irrigated and hemostasis was achieved. Hemoclips were placed within he lumpectomy bed. Her wounds were then closed in a layered fashion using multiple interrupted 3-8 0 Monocryl suture and a running 4-0 Monocryl subcuticular stitch. All counts were correct. The skin was cleaned and dried. Surgical glue, fluffs and a bra were applied. The patient was then extubated and taken to recovery in stable condition. Patient Disposition:  extubated and stable    [place] Node Biopsy for Breast Cancer - Left  Operation performed with curative intent. Yes   Tracer(s) used to identify sentinel nodes in the upfront surgery (non-neoadjuvant) setting (select all that apply). Dye and Radioactive tracer   Tracer(s) used to identify sentinel nodes in the neoadjuvant setting (select all that apply). N/A   All nodes (colored or non-colored) present at the end of a dye-filled lymphatic channel were removed. Yes   All significantly radioactive nodes were removed. Yes   All palpably suspicious nodes were removed. N/A   Biopsy-proven positive nodes marked with clips prior to chemotherapy were identified and removed.  N/A            SIGNATURE: Elena Walker MD  DATE: November 3, 2023  TIME: 3:01 PM

## 2023-11-03 NOTE — DISCHARGE INSTR - AVS FIRST PAGE
POST-OPERATIVE CARE INSTRUCTIONS       Care after your procedure:   General  Rest and relax for 24 hours, then gradually return to normal activities. Do not preform any heavy lifting or strenuous physical activities for 14 days. Your activity restrictions will be re-evaluated at your post op visit. Drink clear liquids until you are certain there is no nausea, then resume a normal diet. Do not drink alcohol, drive any vehicle, operate mechanical equipment or make critical decisions for at least 24 hours and until you are off any narcotic pain medications. The Incision  Your incision is closed with:   dissolvable stiches just underneath the skin. The incision is also covered with:                          clear waterproof glue  A gauze-pad is covering the wound. Wound care  Remove your gauze-pad after 24 hours. You may then shower using soap and water to clean your incision. Gently dry the wound. You may redress your wound with additional gauze and tape if you choose. A little bruising at the wound site is normal.    Medication  Resume all previous medications  Take either Naproxen (Aleve) one tablet every 8 hours or Ibuprofen(Advil/Motrin) one(1) to two(2) tablets every 6 hours as needed  Pain Medication Instructions:over the counter tylenol or prescription tramadol if needed          Other (If applicable)  Wear a post-surgical bra around the clock. May use ice to the incision site(s) for the next 24-48 hours, twice daily.    Call your  doctor if you have any of the following:  Redness, swelling, heat, drainage, and/or bleeding from your wound  Chills or fever ( above 101' F )  Pain, not relieved with the above medications  If you have any questions or problems call our office [phone]    Follow-up appointment:  As scheduled

## 2023-11-03 NOTE — ANESTHESIA POSTPROCEDURE EVALUATION
Post-Op Assessment Note    CV Status:  Stable    Pain management: adequate     Mental Status:  Alert and awake   Hydration Status:  Euvolemic   PONV Controlled:  Controlled   Airway Patency:  Patent      Post Op Vitals Reviewed: Yes      Staff: Anesthesiologist, CRNA         No notable events documented.     BP      Temp     Pulse     Resp      SpO2      /88   Pulse 62   Temp (!) 97.2 °F (36.2 °C) (Temporal)   Resp 16   Ht 5' 2" (1.575 m)   Wt 71.5 kg (157 lb 10.1 oz)   LMP  (LMP Unknown)   SpO2 96%   BMI 28.83 kg/m²

## 2023-11-03 NOTE — ANESTHESIA PREPROCEDURE EVALUATION
Procedure:  LUMPECTOMY BREAST WYATT LOCALIZED (Left: Breast)  BX LYMPH NODE SENTINEL, LYMPHATIC MAPPING, LYMPHOSCINTIGRAPHY; (Left: Axilla)    Relevant Problems   GYN   (+) Malignant neoplasm of central portion of left breast in female, estrogen receptor positive       NEURO/PSYCH   (+) Anxiety        Physical Exam    Airway    Mallampati score: II  TM Distance: >3 FB  Neck ROM: full     Dental   No notable dental hx     Cardiovascular  Rhythm: regular, Rate: normal, Cardiovascular exam normal    Pulmonary  Pulmonary exam normal Breath sounds clear to auscultation    Other Findings      Anesthesia Plan  ASA Score- 2     Anesthesia Type- general with ASA Monitors. Additional Monitors:     Airway Plan:            Plan Factors-    Chart reviewed. EKG reviewed. Existing labs reviewed. Patient summary reviewed. Patient is not a current smoker. Patient not instructed to abstain from smoking on day of procedure. Patient did not smoke on day of surgery. There is medical exclusion for perioperative obstructive sleep apnea risk education. Induction- intravenous. Postoperative Plan-     Informed Consent- Anesthetic plan and risks discussed with patient and son Maldonado Shelby).

## 2023-11-06 ENCOUNTER — TELEPHONE (OUTPATIENT)
Dept: HEMATOLOGY ONCOLOGY | Facility: CLINIC | Age: 63
End: 2023-11-06

## 2023-11-06 NOTE — TELEPHONE ENCOUNTER
Returned call to [facility]. She shared concerns post op regarding a low grade temperature 96, 97F and chills. She denies any redness, swelling, drainage or pain  at her surgical sites. She has a slight cough. Discussed symptoms she should watch for regarding any infections or illness. She understands and was appreciative of the call.

## 2023-11-06 NOTE — TELEPHONE ENCOUNTER
Call Transfer   Who are you speaking with? Patient   If it is not the patient, are they listed on an active communication consent form? N/A   Who is the patients HemOnc/SurgOnc provider? Dr. Caryn Madrigal   What is the reason for this call? Patient calling to speak with Dr. Yenifer Mendes nurse in regards to symptoms she's experiencing post lumpectomy. Person/Department that the call was transferred to? Time that call was transferred? Tadeo Naqvi 11:15am   Your call will be transferred now. If you receive a voicemail, please leave a detailed message and a member of the team will return your call as soon as possible. Did you relay this information to the caller?   Yes

## 2023-11-08 PROCEDURE — 88307 TISSUE EXAM BY PATHOLOGIST: CPT | Performed by: PATHOLOGY

## 2023-11-08 PROCEDURE — 88342 IMHCHEM/IMCYTCHM 1ST ANTB: CPT | Performed by: PATHOLOGY

## 2023-11-22 ENCOUNTER — OFFICE VISIT (OUTPATIENT)
Dept: SURGICAL ONCOLOGY | Facility: CLINIC | Age: 63
End: 2023-11-22

## 2023-11-22 VITALS
OXYGEN SATURATION: 96 % | HEIGHT: 62 IN | TEMPERATURE: 96.4 F | WEIGHT: 162.4 LBS | HEART RATE: 83 BPM | DIASTOLIC BLOOD PRESSURE: 90 MMHG | BODY MASS INDEX: 29.88 KG/M2 | SYSTOLIC BLOOD PRESSURE: 112 MMHG

## 2023-11-22 DIAGNOSIS — Z17.0 MALIGNANT NEOPLASM OF CENTRAL PORTION OF LEFT BREAST IN FEMALE, ESTROGEN RECEPTOR POSITIVE: Primary | ICD-10-CM

## 2023-11-22 DIAGNOSIS — C50.112 MALIGNANT NEOPLASM OF CENTRAL PORTION OF LEFT BREAST IN FEMALE, ESTROGEN RECEPTOR POSITIVE: Primary | ICD-10-CM

## 2023-11-22 PROCEDURE — 99024 POSTOP FOLLOW-UP VISIT: CPT | Performed by: SURGERY

## 2023-11-22 NOTE — PROGRESS NOTES
59 y.o. female is here today s/p left lumpectomy and sentinel node biopsy. She reports discomfort in the axilla. Physical Exam  Constitutional:       General: She is not in acute distress. Appearance: Normal appearance. Chest:   Breasts:     Left: Swelling (In the breast), skin change (Well-healing incision in the breast and axilla with no signs of infection) and tenderness (In the axilla) present. Neurological:      Mental Status: She is alert and oriented to person, place, and time. Psychiatric:         Mood and Affect: Mood normal.         Data:   11/3/2023 left lumpectomy and sentinel node biopsy    Stagin mm invasive ductal  Tumor grade 2  LVI absent  Margins negative  Estrogen receptor and progesterone receptor status positive  HER2 status and test method negative  Lymph node assessment/status negative      Neoadjuvant therapy: Not applicable  Stage: IA        Assessment/Plan: 23-year-old female post left breast conservation. She is healing well with no signs of infection. I made supportive recommendations to her including using warm compresses. She is already scheduled to meet with both medical and radiation oncology. Will plan to see her again in 6 months for a survivorship visit or sooner should the need arise.

## 2023-11-27 ENCOUNTER — TELEPHONE (OUTPATIENT)
Dept: HEMATOLOGY ONCOLOGY | Facility: CLINIC | Age: 63
End: 2023-11-27

## 2023-11-27 NOTE — TELEPHONE ENCOUNTER
Called patient and left a message to see if she received or requested an insurance referral from her PCP for appt on 12/1/23 with Dr Tadeo Howell. Told patient I called PCP and left a message that I needed a referral for 12/1/23 appt. Left the [employer] number to call back and let us know if she received one.

## 2023-11-30 NOTE — PROGRESS NOTES
Consultation Note: Medical Oncology:   Jeremy Ahuja 59 y.o. female MRN: [id number]  Unit/Bed#:  Encounter: [id number]    Assessment and Plan:  1. Stage 1A (pT1c, pN0, cM0, Grade 2, ER:+ MA:+ HER-2: 1+) Left Breast Cancer:  Patient is a 62-year-old postmenopausal female, with recently diagnosed stage IA left breast cancer; who presents today for initial consultation. Of particular importance, patient was found to have an irregularly-shaped mass with spiculated margins (Measuring 1.1 x 0.9 x 1.1-centimeters) in the left breast at the 12-o'clock position; with associated architectural distortion on screening mammography in September 2023. Two mildly prominent left axillary lymph nodes were noted, as well. Ultrasound- guided biopsy of the left breast was performed on September 19th, and demonstrated invasive breast carcinoma of no special type (Ductal). Receptor status, as follows: ER: 90-95% MA: 80-85% HER-2 by immunohistochemistry: 1+ (Negative). Given the above-mentioned, in combination with patient's family history, [employer] testing was ordered and resulted on September 30th, without evidence of any clinically significant variants (BRCA-mutation negative). Patient subsequently underwent left breast lumpectomy with sentinel lymph-node sampling on November 3rd, 2023. Pathology was remarkable for invasive ductal carcinoma, grade 2 (Measuring 1.3-centimeters at greatest dimension). No ductal or lobular carcinoma in situ identified. No lymphovascular invasion noted. All margins were negative for invasive carcinoma. All regional lymph nodes were also negative for tumor. Patient was staged, as follows: stage IA (pT1c, pN0, cM0, Grade 2). Patient has since been referred to Radiation Oncology, and is anticipating establishment of care on December 5th Ann Marie Leone M.D.). She presents today for initial consultation.  Discussed recommendations to obtain the Oncotype Dx gene-expression assay to evaluate need for adjuvant systemic-chemotherapy, and patient is in agreement. In the event that patient's recurrence score is low, will proceed with adjuvant endocrine-therapy. Outlined options for the above-mentioned, including an aromatase-inhibitor (Preferred) versus Tamoxifen, as well as duration of treatment (e.g. At least 5-years of therapy). Patient is amenable to start adjuvant aromatase-inhibitor therapy at this time. Explained adverse-effects of the above-mentioned, and answered all questions. Of note, baseline DEXA-scan was performed on October 16th, 2023 and shows normal bone density. Will plan to start Anastrozole 1 mg Daily, pending results of Oncotype Dx testing. Will also recommend daily Calcium, and Vitamin D supplementation, as well as regular weight-bearing exercise. Plan to repeat a DEXA-scan in 2-years. Patient to follow-up in the outpatient setting in 3-months (e.g. Sooner if Oncotype Dx results show high-risk). Note: Will plan for repeat mammography in September 2024.   1. Will send for Oncotype Dx today. 2. Discussed options for adjuvant endocrine-therapy:    A. Patient opting for an aromatase inhibitor, as follows: Anastrozole 1 mg Daily. B. Note: Will wait for results of Oncotype Dx prior to initiating endocrine-therapy. 3. Recommend daily Calcium, and Vitamin D supplementation, as well as weight-bearing exercise, given the above. 4. Patient to follow-up in the outpatient setting in 3-months for further evaluation and management. 5. Plan for repeat Mammogram in September 2024, and repeat DEXA-Scan in October 2025. Subjective:  History of Present Illness: Britney Negro is a 61-year-old postmenopausal female, with recently diagnosed stage IA left breast cancer; who presents today for initial consultation.  Of particular importance, patient was found to have an irregularly-shaped mass with spiculated margins (Measuring 1.1 x 0.9 x 1.1-centimeters) in the left breast at the 12-o'clock position; with associated architectural distortion on screening mammography in September 2023. Two mildly prominent left axillary lymph nodes were noted, as well. Ultrasound- guided biopsy of the left breast was performed on September 19th, and demonstrated invasive breast carcinoma of no special type (Ductal). Receptor status, as follows: ER: 90-95% IA: 80-85% HER-2 by immunohistochemistry: 1+ (Negative). Given the above-mentioned, in combination with patient's family history, [employer] testing was ordered and resulted on September 30th, without evidence of any clinically significant variants (BRCA-mutation negative). Patient subsequently underwent left breast lumpectomy with sentinel lymph-node sampling on November 3rd, 2023. Pathology was remarkable for invasive ductal carcinoma, grade 2 (Measuring 1.3-centimeters at greatest dimension). No ductal or lobular carcinoma in situ identified. No lymphovascular invasion noted. All margins were negative for invasive carcinoma. All regional lymph nodes were also negative for tumor. Patient was staged, as follows: stage IA (pT1c, pN0, cM0, Grade 2). Patient has since been referred to Radiation Oncology, and is anticipating establishment of care on December 5th Kirill Santoro M.D.). She presents today for initial consultation. On evaluation this morning, patient states that she is doing exceptionally well. She qualifies that she has noted worsening erythema, with accompanying pruritus surrounding her surgical site (Left breast lumpectomy incision) since Monday, November 27th; however, the above-mentioned has since improved over the course of the week. Pruritis has almost entirely resolved. Otherwise, she states that she is doing well. Per comprehensive chart review, it appears that patient was seen by Obstetrics and Gynecology in May 2020.  Documentation suggests that patient was previously on hormone-replacement therapy with Estradiol-Norethindrone; however, it appears that she was weaned off the above-mentioned beginning on May 4th, 2020. She has not been on hormone-replacement since the above-mentioned. She reports a family history of breast cancer, as well; particularly including a maternal cousin, as well as maternal aunt. Other cancers in the extended family, include colorectal, and non-melanoma cutaneous cancers. Of note, patient underwent genetic testing, of which was unremarkable for BRCA-mutation. Patient is a former smoker (e.g. Quit in ); although consumed tobacco-products intermittently, and always less than 0.5-pack/day (Less than 20-Pack/Years). She has not qualified for lung cancer screening. She had a screening colonoscopy in 2021, of which was unremarkable. Review of Systems:  All systems reviewed and negative except otherwise listed in the History of Present Illness.     Oncologic History:   Malignant neoplasm of central portion of left breast in female, estrogen receptor positive    2023 -  Cancer Staged     Staging form: Breast, AJCC 8th Edition  - Clinical stage from 2023: Stage IA (cT1, cN0, cM0, G2, ER+, SC+, HER2-) - Signed by Gaston Shay MD on 10/4/2023  Stage prefix: Initial diagnosis  Method of lymph node assessment: Clinical  Histologic grading system: 3 grade system         10/4/2023 Initial Diagnosis     Malignant neoplasm of central portion of left breast in female, estrogen receptor positive      Historical Information:  Past Medical History:   Diagnosis Date    Allergies     Anal fissure     Back pain     chronic    Endometriosis     Environmental allergies     Genital herpes simplex      Past Surgical History:   Procedure Laterality Date    BREAST BIOPSY Left 2023    BREAST LUMPECTOMY Left 11/3/2023    Procedure: LUMPECTOMY BREAST WYATT LOCALIZED;  Surgeon: Gaston Shay MD;  Location: [facility];  Service: Surgical Oncology     SECTION      COLONOSCOPY      HEMORROIDECTOMY      LAPAROSCOPIC ENDOMETRIOSIS FULGURATION LYMPH NODE BIOPSY Left 11/3/2023    Procedure: BX LYMPH NODE SENTINEL, LYMPHATIC MAPPING, LYMPHOSCINTIGRAPHY;;  Surgeon: Araceli Licona MD;  Location: [facility];  Service: Surgical Oncology    LA SPHINCTEROTOMY ANAL DIVISION SPHINCTER SPX N/A 2016    Procedure: LEFT LATERAL CLOSED PARTIAL INTERNAL SPHINCTEROTOMY; FISSURECTOMY; POST ANAL ROTATING SKIN FLAP ANOPLASTY;  Surgeon: Heather Feng MD;  Location: [facility];  Service: Colorectal    TONSILLECTOMY      TUBAL LIGATION      US GUIDED BREAST BIOPSY LEFT COMPLETE Left 2023    WISDOM TOOTH EXTRACTION       Social History:  Social History     Substance and Sexual Activity   Alcohol Use Not Currently    Alcohol/week: 3.0 standard drinks of alcohol    Types: 3 Standard drinks or equivalent per week    Comment: week     Social History     Substance and Sexual Activity   Drug Use No     Social History     Tobacco Use   Smoking Status Former    Years: 15.00    Types: Cigarettes    Quit date:     Years since quittin.9   Smokeless Tobacco Never     E-Cigarette/Vaping    E-Cigarette Use Never User      E-Cigarette/Vaping Substances    Nicotine No     THC No     CBD No     Flavoring No     Other No     Unknown No      Family History: Non-Contributory. Medications and Allergies: All Medications Reviewed. Objective:  Vitals  There were no vitals filed for this visit. Physical Exam:  General: Alert, and oriented; Pleasant, and conversational; No apparent distress; Well-Appearing Female  HEENT: Atraumatic, and normocephalic; PERRLA; EOMI; Moist mucosal membranes  Chest: Left Breast with Surgical Incision Site: Well-Healed; Adjacent and Mild Erythema Surrounding the Surgical Incision Site and Extending Below the Nipple Line; Pruritic to Touch; Left Axillary Incision: Well-Healed  Neck: Trachea midline; No neck masses, thyromegaly, or cervical lymphadenopathy present  Cardiovascular: Regular rate and rhythm;  No murmurs, rubs, or gallops appreciated; No edema  Respiratory: Clear to auscultation bilaterally; Adequate aeration; No supplemental oxygen requirement  Abdomen: Soft/Overweight, non-tender; Non-distended; No organomegaly appreciated; Bowel sounds present  Extremities: No obvious deformities; No peripheral edema; Moves all  Neurologic: Appropriately alert, and oriented to Person, Place, and Time; No focal neurologic deficits    Lab Results: I have reviewed all pertinent labs. Imaging: I have personally reviewed pertinent reports. EKG, Pathology, and Other Studies: I have personally reviewed pertinent reports. Patient was seen and discussed with attending physician, AURELIANO Marquez D.O.   Hematology-Oncology Fellow (PGY-4)

## 2023-12-01 ENCOUNTER — OFFICE VISIT (OUTPATIENT)
Dept: HEMATOLOGY ONCOLOGY | Facility: CLINIC | Age: 63
End: 2023-12-01
Payer: COMMERCIAL

## 2023-12-01 VITALS
BODY MASS INDEX: 29.9 KG/M2 | DIASTOLIC BLOOD PRESSURE: 90 MMHG | WEIGHT: 162.5 LBS | SYSTOLIC BLOOD PRESSURE: 128 MMHG | RESPIRATION RATE: 18 BRPM | HEART RATE: 87 BPM | OXYGEN SATURATION: 97 % | HEIGHT: 62 IN | TEMPERATURE: 97.6 F

## 2023-12-01 DIAGNOSIS — C50.112 MALIGNANT NEOPLASM OF CENTRAL PORTION OF LEFT BREAST IN FEMALE, ESTROGEN RECEPTOR POSITIVE: ICD-10-CM

## 2023-12-01 DIAGNOSIS — Z17.0 MALIGNANT NEOPLASM OF CENTRAL PORTION OF LEFT BREAST IN FEMALE, ESTROGEN RECEPTOR POSITIVE: ICD-10-CM

## 2023-12-01 PROCEDURE — 99245 OFF/OP CONSLTJ NEW/EST HI 55: CPT | Performed by: INTERNAL MEDICINE

## 2023-12-01 RX ORDER — ANASTROZOLE 1 MG/1
1 TABLET ORAL DAILY
Qty: 30 TABLET | Refills: 11 | Status: SHIPPED | OUTPATIENT
Start: 2023-12-01 | End: 2024-11-25

## 2023-12-04 ENCOUNTER — LAB REQUISITION (OUTPATIENT)
Dept: LAB | Facility: HOSPITAL | Age: 63
End: 2023-12-04

## 2023-12-04 DIAGNOSIS — Z17.0 ESTROGEN RECEPTOR POSITIVE STATUS (ER+): ICD-10-CM

## 2023-12-05 ENCOUNTER — RADIATION ONCOLOGY CONSULT (OUTPATIENT)
Dept: RADIATION ONCOLOGY | Facility: CLINIC | Age: 63
End: 2023-12-05
Payer: COMMERCIAL

## 2023-12-05 ENCOUNTER — PATIENT OUTREACH (OUTPATIENT)
Dept: HEMATOLOGY ONCOLOGY | Facility: CLINIC | Age: 63
End: 2023-12-05

## 2023-12-05 ENCOUNTER — CLINICAL SUPPORT (OUTPATIENT)
Dept: RADIATION ONCOLOGY | Facility: CLINIC | Age: 63
End: 2023-12-05
Attending: RADIOLOGY
Payer: COMMERCIAL

## 2023-12-05 VITALS
HEIGHT: 62 IN | TEMPERATURE: 98.4 F | WEIGHT: 162.26 LBS | BODY MASS INDEX: 29.86 KG/M2 | HEART RATE: 68 BPM | SYSTOLIC BLOOD PRESSURE: 149 MMHG | OXYGEN SATURATION: 99 % | DIASTOLIC BLOOD PRESSURE: 88 MMHG

## 2023-12-05 DIAGNOSIS — C50.112 MALIGNANT NEOPLASM OF CENTRAL PORTION OF LEFT BREAST IN FEMALE, ESTROGEN RECEPTOR POSITIVE: Primary | ICD-10-CM

## 2023-12-05 DIAGNOSIS — Z17.0 MALIGNANT NEOPLASM OF CENTRAL PORTION OF LEFT BREAST IN FEMALE, ESTROGEN RECEPTOR POSITIVE: Primary | ICD-10-CM

## 2023-12-05 DIAGNOSIS — C50.112 MALIGNANT NEOPLASM OF CENTRAL PORTION OF LEFT BREAST IN FEMALE, ESTROGEN RECEPTOR POSITIVE: ICD-10-CM

## 2023-12-05 DIAGNOSIS — Z17.0 MALIGNANT NEOPLASM OF CENTRAL PORTION OF LEFT BREAST IN FEMALE, ESTROGEN RECEPTOR POSITIVE: ICD-10-CM

## 2023-12-05 PROCEDURE — 99245 OFF/OP CONSLTJ NEW/EST HI 55: CPT | Performed by: RADIOLOGY

## 2023-12-05 PROCEDURE — 99211 OFF/OP EST MAY X REQ PHY/QHP: CPT | Performed by: RADIOLOGY

## 2023-12-05 NOTE — LETTER
2023     Guillermina Reed MD  [facility]  [address] [place] [address]    Patient: Megan Newberry   YOB: 1960   Date of Visit: 2023       Dear Dr. Damari Burnette: Thank you for referring Wes Loveless to me for evaluation. Below are my notes for this consultation. If you have questions, please do not hesitate to call me. I look forward to following your patient along with you. Sincerely,        Florencio Espinoza MD        CC: No Recipients    Florencio Espinoza MD  2023  1:53 PM  Sign when Signing Visit  Consultation - Radiation Oncology      [id number] : 1960  Encounter: [id number]  Patient Information: [address]  Chief Complaint   Patient presents with   • Consult     Cancer Staging   Malignant neoplasm of central portion of left breast in female, estrogen receptor positive   Staging form: Breast, AJCC 8th Edition  - Clinical stage from 2023: Stage IA (cT1, cN0, cM0, G2, ER+, AR+, HER2-) - Signed by Guillermina Reed MD on 10/4/2023  Stage prefix: Initial diagnosis  Method of lymph node assessment: Clinical  Histologic grading system: 3 grade system  - Pathologic stage from 11/3/2023: Stage IA (pT1c, pN0(sn), cM0, G2, ER+, AR+, HER2-) - Signed by Guillermina Reed MD on 2023  Stage prefix: Initial diagnosis  Method of lymph node assessment: [place] lymph node biopsy  Histologic grading system: 3 grade system           History of Present Illness   Megan Newberry 1960 is a 59 y.o. female Presents in consult for discussion of RT for recently diagnosed StageIA ER/AR+, HER2- (pNO(sn), cMO, G2) left breast cancer. Referred by Dr. Damari Burnette. Presented for screening mammogram with results revealing a high density irregularly shaped mass with spiculated margins and 2 mildly prominent left axillary lymph nodes. Follow-up contrast enhanced diagnostic mammogram revealed a 1.1cm high density mass as well as a 1mm satellite nodule. Noted to have normal morphology of the lymph node seen previously. US guided biopsy revealed invasive carcinoma of no special type (ductal NST/invasive ductal carcinoma)     9/30/23  Genetic Screening NEGATIVE: No clinically significant variants detected     10/4/23  Dr. Manish Gallegos  Candidate for breast conservation and desires to proceed with surgery. Will need at least adjuvant hormonal therapy; will see medical and radiation oncology after surgery.      11/3/23  Tissue Exam  INVASIVE CARCINOMA OF THE BREAST: Resection   8th Edition - Protocol posted: 9/21/2022INVASIVE CARCINOMA OF THE BREAST: COMPLETE EXCISION - All Specimens        SPECIMEN   Procedure   Excision (less than total mastectomy)   Specimen Laterality   Left   TUMOR   Tumor Site   Not specified   Histologic Type   Invasive carcinoma of no special type (ductal)   Histologic Grade (Mar Histologic Score)       Glandular (Acinar) / Tubular Differentiation   Score 3   Nuclear Pleomorphism   Score 2   Mitotic Rate   Score 1   Overall Grade   Grade 2 (scores of 6 or 7)   Tumor Size   Greatest dimension of largest invasive focus (Millimeters): 13 mm   Tumor Focality   Single focus of invasive carcinoma   Ductal Carcinoma In Situ (DCIS)   Not identified   Lobular Carcinoma In Situ (LCIS)   Not identified   Lymphovascular Invasion   Not identified   Microcalcifications   Not identified   Treatment Effect in the Breast   No known presurgical therapy   MARGINS   Margin Status for Invasive Carcinoma   All margins negative for invasive carcinoma   Distance from Invasive Carcinoma to Closest Margin   Greater than: 5 mm   Distance from Invasive Carcinoma to Anterior Margin   Greater than: 5 mm   Distance from Invasive Carcinoma to Posterior Margin   Greater than: 5 mm   Distance from Invasive Carcinoma to Superior Margin   Greater than: 5 mm   Distance from Invasive Carcinoma to Inferior Margin   Greater than: 5 mm   Distance from Invasive Carcinoma to Medial Margin   Greater than: 5 mm   Distance from Invasive Carcinoma to Lateral Margin   Greater than: 5 mm   REGIONAL LYMPH NODES   Regional Lymph Node Status   All regional lymph nodes negative for tumor   Total Number of Lymph Nodes Examined (sentinel and non-sentinel)   2   Number of Coleman Nodes Examined   2   PATHOLOGIC STAGE CLASSIFICATION (pTNM, AJCC 8th Edition)   Reporting of pT, pN, and (when applicable) pM categories is based on information available to the pathologist at the time the report is issued. As per the AJCC (Chapter 1, 8th Ed.) it is the managing physician's responsibility to establish the final pathologic stage based upon all pertinent information, including but potentially not limited to this pathology report. pT Category   pT1c   Regional Lymph Nodes Modifier   (sn): Coleman node(s) evaluated. pN Category   pN0           Testing Performed on Case Number   [id number]   .      11/22/23  Dr. Damari Burnette  Stage IA ER/VT+  Margins negative. Has appointments with medical and radiation oncology scheduled. Will f/u in 6 months     12/1/23  Dr. Jocelin Alvarez  Discussed endocrine therapy.   Is interested in Oncotype Dx testing since tumor greater than 0.5cm  Will f/u over phone to discuss results        Upcoming:  3/22/24 Dr. Jocelin Alvarez  5/28/24  Surgical Oncology      Historical Information   Oncology History   Malignant neoplasm of central portion of left breast in female, estrogen receptor positive    9/19/2023 -  Cancer Staged    Staging form: Breast, AJCC 8th Edition  - Clinical stage from 9/19/2023: Stage IA (cT1, cN0, cM0, G2, ER+, VT+, HER2-) - Signed by Guillermina Reed MD on 10/4/2023  Stage prefix: Initial diagnosis  Method of lymph node assessment: Clinical  Histologic grading system: 3 grade system       10/4/2023 Initial Diagnosis    Malignant neoplasm of central portion of left breast in female, estrogen receptor positive      11/3/2023 Surgery    Left breast lumpectomy, SLNB     11/3/2023 -  Cancer Staged Staging form: Breast, AJCC 8th Edition  - Pathologic stage from 11/3/2023: Stage IA (pT1c, pN0(sn), cM0, G2, ER+, OR+, HER2-) - Signed by Valente Valero MD on 2023  Stage prefix: Initial diagnosis  Method of lymph node assessment: [place] lymph node biopsy  Histologic grading system: 3 grade system             Past Medical History:   Diagnosis Date   • Allergies    • Anal fissure    • Back pain     chronic   • Breast cancer ([address])    • Endometriosis    • Environmental allergies    • Genital herpes simplex      Past Surgical History:   Procedure Laterality Date   • BREAST BIOPSY Left 2023   • BREAST LUMPECTOMY Left 11/3/2023    Procedure: LUMPECTOMY BREAST WYATT LOCALIZED;  Surgeon: Valente Valero MD;  Location: [facility];  Service: Surgical Oncology   •  SECTION     • COLONOSCOPY     • HEMORROIDECTOMY     • LAPAROSCOPIC ENDOMETRIOSIS FULGURATION     • LYMPH NODE BIOPSY Left 11/3/2023    Procedure: BX LYMPH NODE SENTINEL, LYMPHATIC MAPPING, LYMPHOSCINTIGRAPHY;;  Surgeon: Valente Valero MD;  Location: [facility];  Service: Surgical Oncology   • OR SPHINCTEROTOMY ANAL DIVISION SPHINCTER SPX N/A 2016    Procedure: LEFT LATERAL CLOSED PARTIAL INTERNAL SPHINCTEROTOMY; FISSURECTOMY; POST ANAL ROTATING SKIN FLAP ANOPLASTY;  Surgeon: Rick Drummond MD;  Location: [facility];  Service: Colorectal   • TONSILLECTOMY     • TUBAL LIGATION     • US GUIDED BREAST BIOPSY LEFT COMPLETE Left 2023   • WISDOM TOOTH EXTRACTION         Family History   Problem Relation Age of Onset   • Diabetes Mother         non insulin   • No Known Problems Father    • Skin cancer Brother 64   • Breast cancer Maternal Aunt         60's   • No Known Problems Paternal Aunt    • No Known Problems Paternal Aunt    • Cancer Maternal Grandmother         80's   • No Known Problems Maternal Grandfather    • Colon cancer Paternal Grandmother         52's   • Cancer Paternal Grandfather         unk type in his 80's   • Breast cancer Cousin         42's       Social History   Social History     Substance and Sexual Activity   Alcohol Use Not Currently   • Alcohol/week: 3.0 standard drinks of alcohol   • Types: 3 Standard drinks or equivalent per week    Comment: Socially     Social History     Substance and Sexual Activity   Drug Use Never     Social History     Tobacco Use   Smoking Status Former   • Years: 15.00   • Types: Cigarettes   • Quit date:    • Years since quittin.9   Smokeless Tobacco Never         Meds/Allergies     Current Outpatient Medications:   •  anastrozole (ARIMIDEX) 1 mg tablet, Take 1 tablet (1 mg total) by mouth daily, Disp: 30 tablet, Rfl: 11  •  Cholecalciferol 25 MCG (1000 UT) capsule, Take 5,000 Units by mouth daily, Disp: , Rfl:   •  cyanocobalamin (VITAMIN B-12) 1000 MCG tablet, , Disp: , Rfl:   •  Glucosamine-Chondroitin 4397-7871 MG/30ML LIQD, , Disp: , Rfl:   •  levocetirizine (XYZAL) 5 MG tablet, Take 5 mg by mouth every evening, Disp: , Rfl:   •  Multiple Vitamins-Minerals (MULTIVITAMIN ADULT EXTRA C PO), Take 1 capsule by mouth, Disp: , Rfl:   •  Omega-3 Fatty Acids (Fish Oil) 1200 MG CAPS, , Disp: , Rfl:   •  buPROPion (WELLBUTRIN XL) 150 mg 24 hr tablet, Take by mouth, Disp: , Rfl:   •  calcium citrate-vitamin D 315 mg-5 mcg tablet, Take 1 tablet by mouth 2 (two) times a day (Patient not taking: Reported on 10/4/2023), Disp: , Rfl:   •  traMADol (ULTRAM) 50 mg tablet, Take 1 tablet (50 mg total) by mouth every 8 (eight) hours as needed for moderate pain, Disp: 9 tablet, Rfl: 0  •  zaleplon (SONATA) 5 MG capsule, Take 5 mg by mouth daily as needed, Disp: , Rfl:   No Known Allergies      Review of Systems  Constitutional: Negative. Negative for chills and unexpected weight change. HENT: Negative. Allergy related symptoms   Eyes: Negative. Respiratory: Negative. Cardiovascular: Negative. Gastrointestinal: Negative. Endocrine: Negative. Genitourinary: Negative. Musculoskeletal: Negative. Skin: Negative. Breast incision is healing. Breast is swollen   Allergic/Immunologic: Positive for environmental allergies. Negative for food allergies. Neurological:  Positive for numbness (left foot). Psychiatric/Behavioral:  Positive for sleep disturbance. Clinical Trial: no     OB/GYN History:  The patient underwent menarche at 16 years  Menopause Status Post  No LMP recorded (lmp unknown). Patient is postmenopausal.  Menopause at  unknown years. Menopause Reason Natural  Hormone replacement therapy: yes. Years used 5   2   Para 2   Age at first delivery being 35 years. Nursing: no.   Birth control pills: yes. Years used 20      OBJECTIVE:   /88 (BP Location: Right arm, Patient Position: Sitting, Cuff Size: Standard)   Pulse 68   Temp 98.4 °F (36.9 °C) (Temporal)   Ht 5' 2" (1.575 m)   Wt 73.6 kg (162 lb 4.1 oz)   LMP  (LMP Unknown)   SpO2 99%   BMI 29.68 kg/m²   Pain Assessment:  0  Performance Status: ECOG/Zubrod/WHO: 0 - Asymptomatic    Physical Exam   There is no supraclavicular or axillary adenopathy palpable. She has edema of the left breast.  Her incisions are healing well without significant erythema or drainage noted. Her breathing is unlabored. Abdomen soft nontender. Conversing appropriately. Ambulating dependently. No evidence of lymphedema      RESULTS  Lab Results    Chemistry        Component Value Date/Time    K 4.0 10/09/2023 1218     10/09/2023 1218    CO2 29 10/09/2023 1218    BUN 16 10/09/2023 1218    CREATININE 0.84 10/09/2023 1218        Component Value Date/Time    CALCIUM 9.8 10/09/2023 1218    ALKPHOS 60 10/09/2023 1218    AST 20 10/09/2023 1218    ALT 17 10/09/2023 1218            Lab Results   Component Value Date    WBC 5.69 10/09/2023    HGB 13.6 10/09/2023    HCT 43.2 10/09/2023    MCV 96 10/09/2023     10/09/2023         Imaging Studies  No results found. ASSESSMENT  1. Malignant neoplasm of central portion of left breast in female, estrogen receptor positive   Ambulatory referral to Radiation Oncology        Cancer Staging   Malignant neoplasm of central portion of left breast in female, estrogen receptor positive   Staging form: Breast, AJCC 8th Edition  - Clinical stage from 9/19/2023: Stage IA (cT1, cN0, cM0, G2, ER+, SD+, HER2-) - Signed by Radha Boykin MD on 10/4/2023  Stage prefix: Initial diagnosis  Method of lymph node assessment: Clinical  Histologic grading system: 3 grade system  - Pathologic stage from 11/3/2023: Stage IA (pT1c, pN0(sn), cM0, G2, ER+, SD+, HER2-) - Signed by Radha Boykin MD on 11/22/2023  Stage prefix: Initial diagnosis  Method of lymph node assessment: [place] lymph node biopsy  Histologic grading system: 3 grade system        PLAN/DISCUSSION  No orders of the defined types were placed in this encounter. Sage Nick is a 59y.o. year old female with stage Ia (T1 cN0 G2) left breast carcinoma which is ER/SD positive and HER2 negative. I reviewed with her the role of adjuvant radiation therapy. We discussed whole breast radiation as well as APBI which she would be a candidate. She understands the goal is to reduce her risk of recurrence. The volume irradiated with each modality was reviewed as well as possible side effects. This can include but not limited to fatigue, skin erythema, desquamation, hyperpigmentation, breast fibrosis, pneumonitis, low-dose to her heart. She is electing to pursue APBI should she meet dosimetric criteria. She has met with medical oncology who has sent her specimen for OncoDX analysis with results pending. We discussed that should she receive hormonal therapy alone she can return for simulation. If however she will be receiving chemotherapy she understands this we will proceed her radiation treatment.   In addition should she receive chemotherapy I have recommended whole breast radiation utilizing hypofractionated regimen. We will await her systemic recommendation for final RT plan. She prefers to undergo APBI if possible. Lanna Runner, MD  12/5/2023,1:12 PM      Portions of the record may have been created with voice recognition software. Occasional wrong word or "sound a like" substitutions may have occurred due to the inherent limitations of voice recognition software. Read the chart carefully and recognize, using context, where substitutions have occurred.

## 2023-12-05 NOTE — PROGRESS NOTES
Consultation - Radiation Oncology      [id number] : 1960  Encounter: [id number]  Patient Information: [address]  Chief Complaint   Patient presents with    Consult     Cancer Staging   Malignant neoplasm of central portion of left breast in female, estrogen receptor positive   Staging form: Breast, AJCC 8th Edition  - Clinical stage from 2023: Stage IA (cT1, cN0, cM0, G2, ER+, OK+, HER2-) - Signed by Sheliah Primrose, MD on 10/4/2023  Stage prefix: Initial diagnosis  Method of lymph node assessment: Clinical  Histologic grading system: 3 grade system  - Pathologic stage from 11/3/2023: Stage IA (pT1c, pN0(sn), cM0, G2, ER+, OK+, HER2-) - Signed by Sheliah Primrose, MD on 2023  Stage prefix: Initial diagnosis  Method of lymph node assessment: [place] lymph node biopsy  Histologic grading system: 3 grade system           History of Present Illness   Feilx Moya 1960 is a 59 y.o. female Presents in consult for discussion of RT for recently diagnosed StageIA ER/OK+, HER2- (pNO(sn), cMO, G2) left breast cancer. Referred by Dr. Debbie Lambert. Presented for screening mammogram with results revealing a high density irregularly shaped mass with spiculated margins and 2 mildly prominent left axillary lymph nodes. Follow-up contrast enhanced diagnostic mammogram revealed a 1.1cm high density mass as well as a 1mm satellite nodule. Noted to have normal morphology of the lymph node seen previously. US guided biopsy revealed invasive carcinoma of no special type (ductal NST/invasive ductal carcinoma)     23  Genetic Screening NEGATIVE: No clinically significant variants detected     10/4/23  Dr. Debbie Lambert  Candidate for breast conservation and desires to proceed with surgery. Will need at least adjuvant hormonal therapy; will see medical and radiation oncology after surgery.      11/3/23  Tissue Exam  INVASIVE CARCINOMA OF THE BREAST: Resection   8th Edition - Protocol posted: 9/21/2022INVASIVE CARCINOMA OF THE BREAST: COMPLETE EXCISION - All Specimens        SPECIMEN   Procedure   Excision (less than total mastectomy)   Specimen Laterality   Left   TUMOR   Tumor Site   Not specified   Histologic Type   Invasive carcinoma of no special type (ductal)   Histologic Grade ([place] Histologic Score)       Glandular (Acinar) / Tubular Differentiation   Score 3   Nuclear Pleomorphism   Score 2   Mitotic Rate   Score 1   Overall Grade   Grade 2 (scores of 6 or 7)   Tumor Size   Greatest dimension of largest invasive focus (Millimeters): 13 mm   Tumor Focality   Single focus of invasive carcinoma   Ductal Carcinoma In Situ (DCIS)   Not identified   Lobular Carcinoma In Situ (LCIS)   Not identified   Lymphovascular Invasion   Not identified   Microcalcifications   Not identified   Treatment Effect in the Breast   No known presurgical therapy   MARGINS   Margin Status for Invasive Carcinoma   All margins negative for invasive carcinoma   Distance from Invasive Carcinoma to Closest Margin   Greater than: 5 mm   Distance from Invasive Carcinoma to Anterior Margin   Greater than: 5 mm   Distance from Invasive Carcinoma to Posterior Margin   Greater than: 5 mm   Distance from Invasive Carcinoma to Superior Margin   Greater than: 5 mm   Distance from Invasive Carcinoma to Inferior Margin   Greater than: 5 mm   Distance from Invasive Carcinoma to Medial Margin   Greater than: 5 mm   Distance from Invasive Carcinoma to Lateral Margin   Greater than: 5 mm   REGIONAL LYMPH NODES   Regional Lymph Node Status   All regional lymph nodes negative for tumor   Total Number of Lymph Nodes Examined (sentinel and non-sentinel)   2   Number of [place] Nodes Examined   2   PATHOLOGIC STAGE CLASSIFICATION (pTNM, AJCC 8th Edition)   Reporting of pT, pN, and (when applicable) pM categories is based on information available to the pathologist at the time the report is issued.  As per the AJCC (Chapter 1, 8th Ed.) it is the managing physician's responsibility to establish the final pathologic stage based upon all pertinent information, including but potentially not limited to this pathology report. pT Category   pT1c   Regional Lymph Nodes Modifier   (sn): [place] node(s) evaluated. pN Category   pN0           Testing Performed on Case Number   [id number]   .      11/22/23  Dr. Cierra Kennedy  Stage IA ER/SC+  Margins negative. Has appointments with medical and radiation oncology scheduled. Will f/u in 6 months     12/1/23  Dr. Calli Ribera  Discussed endocrine therapy.   Is interested in Oncotype Dx testing since tumor greater than 0.5cm  Will f/u over phone to discuss results        Upcoming:  3/22/24 Dr. Calli Ribera  5/28/24  Surgical Oncology      Historical Information   Oncology History   Malignant neoplasm of central portion of left breast in female, estrogen receptor positive    9/19/2023 -  Cancer Staged    Staging form: Breast, AJCC 8th Edition  - Clinical stage from 9/19/2023: Stage IA (cT1, cN0, cM0, G2, ER+, SC+, HER2-) - Signed by Yajaira Centeno MD on 10/4/2023  Stage prefix: Initial diagnosis  Method of lymph node assessment: Clinical  Histologic grading system: 3 grade system       10/4/2023 Initial Diagnosis    Malignant neoplasm of central portion of left breast in female, estrogen receptor positive      11/3/2023 Surgery    Left breast lumpectomy, SLNB     11/3/2023 -  Cancer Staged    Staging form: Breast, AJCC 8th Edition  - Pathologic stage from 11/3/2023: Stage IA (pT1c, pN0(sn), cM0, G2, ER+, SC+, HER2-) - Signed by Yajaira Centeno MD on 11/22/2023  Stage prefix: Initial diagnosis  Method of lymph node assessment: [place] lymph node biopsy  Histologic grading system: 3 grade system             Past Medical History:   Diagnosis Date    Allergies     Anal fissure     Back pain     chronic    Breast cancer ([address])     Endometriosis     Environmental allergies     Genital herpes simplex      Past Surgical History:   Procedure Laterality Date    BREAST BIOPSY Left 2023    BREAST LUMPECTOMY Left 11/3/2023    Procedure: LUMPECTOMY BREAST WYATT LOCALIZED;  Surgeon: Jalen Garcia MD;  Location[facility];  Service: Surgical Oncology     SECTION      COLONOSCOPY      HEMORROIDECTOMY      LAPAROSCOPIC ENDOMETRIOSIS FULGURATION      LYMPH NODE BIOPSY Left 11/3/2023    Procedure: BX LYMPH NODE SENTINEL, LYMPHATIC MAPPING, LYMPHOSCINTIGRAPHY;;  Surgeon: Jalen Garcia MD;  Location[facility];  Service: Surgical Oncology    IA SPHINCTEROTOMY ANAL DIVISION SPHINCTER SPX N/A 2016    Procedure: LEFT LATERAL CLOSED PARTIAL INTERNAL SPHINCTEROTOMY; FISSURECTOMY; POST ANAL ROTATING SKIN FLAP ANOPLASTY;  Surgeon: Andrey Gottron, MD;  [facility];  Service: Colorectal    TONSILLECTOMY      TUBAL LIGATION      US GUIDED BREAST BIOPSY LEFT COMPLETE Left 2023    WISDOM TOOTH EXTRACTION         Family History   Problem Relation Age of Onset    Diabetes Mother         non insulin    No Known Problems Father     Skin cancer Brother 64    Breast cancer Maternal Aunt         60's    No Known Problems Paternal Aunt     No Known Problems Paternal Aunt     Cancer Maternal Grandmother         80's    No Known Problems Maternal Grandfather     Colon cancer Paternal Grandmother         52's    Cancer Paternal Grandfather         unk type in his 80's    Breast cancer Cousin         42's       Social History   Social History     Substance and Sexual Activity   Alcohol Use Not Currently    Alcohol/week: 3.0 standard drinks of alcohol    Types: 3 Standard drinks or equivalent per week    Comment: Socially     Social History     Substance and Sexual Activity   Drug Use Never     Social History     Tobacco Use   Smoking Status Former    Years: 15.00    Types: Cigarettes    Quit date:     Years since quittin.9   Smokeless Tobacco Never         Meds/Allergies     Current Outpatient Medications:     anastrozole (ARIMIDEX) 1 mg tablet, Take 1 tablet (1 mg total) by mouth daily, Disp: 30 tablet, Rfl: 11    Cholecalciferol 25 MCG (1000 UT) capsule, Take 5,000 Units by mouth daily, Disp: , Rfl:     cyanocobalamin (VITAMIN B-12) 1000 MCG tablet, , Disp: , Rfl:     Glucosamine-Chondroitin 2270-8884 MG/30ML LIQD, , Disp: , Rfl:     levocetirizine (XYZAL) 5 MG tablet, Take 5 mg by mouth every evening, Disp: , Rfl:     Multiple Vitamins-Minerals (MULTIVITAMIN ADULT EXTRA C PO), Take 1 capsule by mouth, Disp: , Rfl:     Omega-3 Fatty Acids (Fish Oil) 1200 MG CAPS, , Disp: , Rfl:     buPROPion (WELLBUTRIN XL) 150 mg 24 hr tablet, Take by mouth, Disp: , Rfl:     calcium citrate-vitamin D 315 mg-5 mcg tablet, Take 1 tablet by mouth 2 (two) times a day (Patient not taking: Reported on 10/4/2023), Disp: , Rfl:     traMADol (ULTRAM) 50 mg tablet, Take 1 tablet (50 mg total) by mouth every 8 (eight) hours as needed for moderate pain, Disp: 9 tablet, Rfl: 0    zaleplon (SONATA) 5 MG capsule, Take 5 mg by mouth daily as needed, Disp: , Rfl:   No Known Allergies      Review of Systems  Constitutional: Negative. Negative for chills and unexpected weight change. HENT: Negative. Allergy related symptoms   Eyes: Negative. Respiratory: Negative. Cardiovascular: Negative. Gastrointestinal: Negative. Endocrine: Negative. Genitourinary: Negative. Musculoskeletal: Negative. Skin: Negative. Breast incision is healing. Breast is swollen   Allergic/Immunologic: Positive for environmental allergies. Negative for food allergies. Neurological:  Positive for numbness (left foot). Psychiatric/Behavioral:  Positive for sleep disturbance. Clinical Trial: no     OB/GYN History:  The patient underwent menarche at 16 years  Menopause Status Post  No LMP recorded (lmp unknown). Patient is postmenopausal.  Menopause at  unknown years. Menopause Reason Natural  Hormone replacement therapy: yes.   Years used 5   2   Para 2   Age at first delivery being 35 years. Nursing: no.   Birth control pills: yes. Years used 20      OBJECTIVE:   /88 (BP Location: Right arm, Patient Position: Sitting, Cuff Size: Standard)   Pulse 68   Temp 98.4 °F (36.9 °C) (Temporal)   Ht 5' 2" (1.575 m)   Wt 73.6 kg (162 lb 4.1 oz)   LMP  (LMP Unknown)   SpO2 99%   BMI 29.68 kg/m²   Pain Assessment:  0  Performance Status: ECOG/Zubrod/WHO: 0 - Asymptomatic    Physical Exam   There is no supraclavicular or axillary adenopathy palpable. She has edema of the left breast.  Her incisions are healing well without significant erythema or drainage noted. Her breathing is unlabored. Abdomen soft nontender. Conversing appropriately. Ambulating dependently. No evidence of lymphedema      RESULTS  Lab Results    Chemistry        Component Value Date/Time    K 4.0 10/09/2023 1218     10/09/2023 1218    CO2 29 10/09/2023 1218    BUN 16 10/09/2023 1218    CREATININE 0.84 10/09/2023 1218        Component Value Date/Time    CALCIUM 9.8 10/09/2023 1218    ALKPHOS 60 10/09/2023 1218    AST 20 10/09/2023 1218    ALT 17 10/09/2023 1218            Lab Results   Component Value Date    WBC 5.69 10/09/2023    HGB 13.6 10/09/2023    HCT 43.2 10/09/2023    MCV 96 10/09/2023     10/09/2023         Imaging Studies  No results found. ASSESSMENT  1.  Malignant neoplasm of central portion of left breast in female, estrogen receptor positive   Ambulatory referral to Radiation Oncology        Cancer Staging   Malignant neoplasm of central portion of left breast in female, estrogen receptor positive   Staging form: Breast, AJCC 8th Edition  - Clinical stage from 9/19/2023: Stage IA (cT1, cN0, cM0, G2, ER+, OR+, HER2-) - Signed by Migdalia Habermann, MD on 10/4/2023  Stage prefix: Initial diagnosis  Method of lymph node assessment: Clinical  Histologic grading system: 3 grade system  - Pathologic stage from 11/3/2023: Stage IA (pT1c, pN0(sn), cM0, G2, ER+, TX+, HER2-) - Signed by Jeannie Gagnon MD on 11/22/2023  Stage prefix: Initial diagnosis  Method of lymph node assessment: [place] lymph node biopsy  Histologic grading system: 3 grade system        PLAN/DISCUSSION  No orders of the defined types were placed in this encounter. Corina Kennedy is a 59y.o. year old female with stage Ia (T1 cN0 G2) left breast carcinoma which is ER/TX positive and HER2 negative. I reviewed with her the role of adjuvant radiation therapy. We discussed whole breast radiation as well as APBI which she would be a candidate. She understands the goal is to reduce her risk of recurrence. The volume irradiated with each modality was reviewed as well as possible side effects. This can include but not limited to fatigue, skin erythema, desquamation, hyperpigmentation, breast fibrosis, pneumonitis, low-dose to her heart. She is electing to pursue APBI should she meet dosimetric criteria. She has met with medical oncology who has sent her specimen for OncoDX analysis with results pending. We discussed that should she receive hormonal therapy alone she can return for simulation. If however she will be receiving chemotherapy she understands this we will proceed her radiation treatment. In addition should she receive chemotherapy I have recommended whole breast radiation utilizing hypofractionated regimen. We will await her systemic recommendation for final RT plan. She prefers to undergo APBI if possible. Anh Licona MD  12/5/2023,1:12 PM      Portions of the record may have been created with voice recognition software. Occasional wrong word or "sound a like" substitutions may have occurred due to the inherent limitations of voice recognition software. Read the chart carefully and recognize, using context, where substitutions have occurred.

## 2023-12-05 NOTE — PROGRESS NOTES
Meron Duong 1960 is a 59 y.o. female Presents in consult for discussion of RT for recently diagnosed StageIA ER/[place]+, HER2- (pNO(sn), cMO, G2) left breast cancer. Referred by Dr. Maria Del Rosario Srivastava. Presented for screening mammogram with results revealing a high density irregularly shaped mass with spiculated margins and 2 mildly prominent left axillary lymph nodes. Follow-up contrast enhanced diagnostic mammogram revealed a 1.1cm high density mass as well as a 1mm satellite nodule. Noted to have normal morphology of the lymph node seen previously. US guided biopsy revealed invasive carcinoma of no special type (ductal NST/invasive ductal carcinoma)    9/30/23  Genetic Screening NEGATIVE: No clinically significant variants detected    10/4/23  Dr. Maria Del Rosario Srivastava  Candidate for breast conservation and desires to proceed with surgery. Will need at least adjuvant hormonal therapy; will see medical and radiation oncology after surgery.     11/3/23  Tissue Exam  INVASIVE CARCINOMA OF THE BREAST: Resection   8th Edition - Protocol posted: 9/21/2022INVASIVE CARCINOMA OF THE BREAST: COMPLETE EXCISION - All Specimens  SPECIMEN   Procedure  Excision (less than total mastectomy)   Specimen Laterality  Left   TUMOR   Tumor Site  Not specified   Histologic Type  Invasive carcinoma of no special type (ductal)   Histologic Grade ([place] Histologic Score)     Glandular (Acinar) / Tubular Differentiation  Score 3   Nuclear Pleomorphism  Score 2   Mitotic Rate  Score 1   Overall Grade  Grade 2 (scores of 6 or 7)   Tumor Size  Greatest dimension of largest invasive focus (Millimeters): 13 mm   Tumor Focality  Single focus of invasive carcinoma   Ductal Carcinoma In Situ (DCIS)  Not identified   Lobular Carcinoma In Situ (LCIS)  Not identified   Lymphovascular Invasion  Not identified   Microcalcifications  Not identified   Treatment Effect in the Breast  No known presurgical therapy   MARGINS   Margin Status for Invasive Carcinoma  All margins negative for invasive carcinoma   Distance from Invasive Carcinoma to Closest Margin  Greater than: 5 mm   Distance from Invasive Carcinoma to Anterior Margin  Greater than: 5 mm   Distance from Invasive Carcinoma to Posterior Margin  Greater than: 5 mm   Distance from Invasive Carcinoma to Superior Margin  Greater than: 5 mm   Distance from Invasive Carcinoma to Inferior Margin  Greater than: 5 mm   Distance from Invasive Carcinoma to Medial Margin  Greater than: 5 mm   Distance from Invasive Carcinoma to Lateral Margin  Greater than: 5 mm   REGIONAL LYMPH NODES   Regional Lymph Node Status  All regional lymph nodes negative for tumor   Total Number of Lymph Nodes Examined (sentinel and non-sentinel)  2   Number of [place] Nodes Examined  2   PATHOLOGIC STAGE CLASSIFICATION (pTNM, AJCC 8th Edition)   Reporting of pT, pN, and (when applicable) pM categories is based on information available to the pathologist at the time the report is issued. As per the AJCC (Chapter 1, 8th Ed.) it is the managing physician's responsibility to establish the final pathologic stage based upon all pertinent information, including but potentially not limited to this pathology report. pT Category  pT1c   Regional Lymph Nodes Modifier  (sn): [place] node(s) evaluated. pN Category  pN0        Testing Performed on Case Number  [id number]   .     11/22/23  Dr. Marnie Devine  Stage IA ER/[place]+  Margins negative. Has appointments with medical and radiation oncology scheduled. Will f/u in 6 months    12/1/23  Dr. Socrates Pyle  Discussed endocrine therapy.   Is interested in Oncotype Dx testing since tumor greater than 0.5cm  Will f/u over phone to discuss results      Upcoming:  3/22/24 Dr. Socrates Pyle  5/28/24  Surgical Oncology    Oncology History   Malignant neoplasm of central portion of left breast in female, estrogen receptor positive    9/19/2023 -  Cancer Staged    Staging form: Breast, AJCC 8th Edition  - Clinical stage from 9/19/2023: Stage IA (cT1, cN0, cM0, G2, ER+, MI+, HER2-) - Signed by Yajaira Centeno MD on 10/4/2023  Stage prefix: Initial diagnosis  Method of lymph node assessment: Clinical  Histologic grading system: 3 grade system       10/4/2023 Initial Diagnosis    Malignant neoplasm of central portion of left breast in female, estrogen receptor positive      11/3/2023 Surgery    Left breast lumpectomy, SLNB     11/3/2023 -  Cancer Staged    Staging form: Breast, AJCC 8th Edition  - Pathologic stage from 11/3/2023: Stage IA (pT1c, pN0(sn), cM0, G2, ER+, MI+, HER2-) - Signed by Yajaira Centeno MD on 2023  Stage prefix: Initial diagnosis  Method of lymph node assessment: [place] lymph node biopsy  Histologic grading system: 3 grade system           Review of Systems:  Review of Systems   Constitutional: Negative. Negative for chills and unexpected weight change. HENT: Negative. Allergy related symptoms   Eyes: Negative. Respiratory: Negative. Cardiovascular: Negative. Gastrointestinal: Negative. Endocrine: Negative. Genitourinary: Negative. Musculoskeletal: Negative. Skin: Negative. Breast incision is healing. Breast is swollen   Allergic/Immunologic: Positive for environmental allergies. Negative for food allergies. Neurological:  Positive for numbness (left foot). Psychiatric/Behavioral:  Positive for sleep disturbance. Clinical Trial: no    OB/GYN History:  The patient underwent menarche at 16 years  Menopause Status Post  No LMP recorded (lmp unknown). Patient is postmenopausal.  Menopause at  unknown years. Menopause Reason Natural  Hormone replacement therapy: yes. Years used 5   2   Para 2   Age at first delivery being 35 years. Nursing: no.   Birth control pills: yes.   Years used 20    Pregnancy test needed:  no    ONCOTYPE/MAMMOPRINT results Oncotype score pending    Prior Radiation None    Teaching [facility] radiation oncology booklet given and reviewed    MST completed    Implantable Devices (Port, Pacemaker, pain stimulator) None    Hip Replacement  None      [unfilled]  Health Maintenance   Topic Date Due    Hepatitis C Screening  Never done    Depression Screening  Never done    HIV Screening  Never done    BMI: Followup Plan  Never done    Annual Physical  Never done    Colorectal Cancer Screening  2021    COVID-19 Vaccine (4 - Pfizer series) 2022    DTaP,Tdap,and Td Vaccines (2 - Td or Tdap) 2022    Influenza Vaccine (1) Never done    Breast Cancer Screening: Mammogram  2024    BMI: Adult  2024    Cervical Cancer Screening  2026    Osteoporosis Screening  Completed    Pneumococcal Vaccine: Pediatrics (0 to 5 Years) and At-Risk Patients (6 to 59 Years)  Aged Out    HIB Vaccine  Aged Out    IPV Vaccine  Aged Out    Hepatitis A Vaccine  Aged Out    Meningococcal ACWY Vaccine  Aged Out    HPV Vaccine  Aged Out     Past Medical History:   Diagnosis Date    Allergies     Anal fissure     Back pain     chronic    Endometriosis     Environmental allergies     Genital herpes simplex      Past Surgical History:   Procedure Laterality Date    BREAST BIOPSY Left 2023    BREAST LUMPECTOMY Left 11/3/2023    Procedure: LUMPECTOMY BREAST WYATT LOCALIZED;  Surgeon: Yajaira Centeno MD;  [facility];  Service: Surgical Oncology     SECTION      COLONOSCOPY      HEMORROIDECTOMY      LAPAROSCOPIC ENDOMETRIOSIS FULGURATION      LYMPH NODE BIOPSY Left 11/3/2023    Procedure: BX LYMPH NODE SENTINEL, LYMPHATIC MAPPING, LYMPHOSCINTIGRAPHY;;  Surgeon: Yajaira Centeno MD;  [facility];  Service: Surgical Oncology    [place] SPHINCTEROTOMY ANAL DIVISION SPHINCTER SPX N/A 2016    Procedure: LEFT LATERAL CLOSED PARTIAL INTERNAL SPHINCTEROTOMY; FISSURECTOMY; POST ANAL ROTATING SKIN FLAP ANOPLASTY;  Surgeon: Kiana Begum MD;  [facility];  Service: Colorectal    TONSILLECTOMY      TUBAL LIGATION      US GUIDED BREAST BIOPSY LEFT COMPLETE Left 2023    WISDOM TOOTH EXTRACTION       Family History   Problem Relation Age of Onset    Diabetes Mother         non insulin    No Known Problems Father     Skin cancer Brother 64    Breast cancer Maternal Aunt         60's    No Known Problems Paternal Aunt     No Known Problems Paternal Aunt     Cancer Maternal Grandmother         80's    No Known Problems Maternal Grandfather     Colon cancer Paternal Grandmother         52's    Cancer Paternal Grandfather         unk type in his 80's    Breast cancer Cousin         42's     Social History     Tobacco Use    Smoking status: Former     Years: 15.00     Types: Cigarettes     Quit date:      Years since quittin.9    Smokeless tobacco: Never   Vaping Use    Vaping Use: Never used   Substance Use Topics    Alcohol use: Not Currently     Alcohol/week: 3.0 standard drinks of alcohol     Types: 3 Standard drinks or equivalent per week     Comment: week    Drug use: No        Current Outpatient Medications:     anastrozole (ARIMIDEX) 1 mg tablet, Take 1 tablet (1 mg total) by mouth daily, Disp: 30 tablet, Rfl: 11    buPROPion (WELLBUTRIN XL) 150 mg 24 hr tablet, Take by mouth, Disp: , Rfl:     calcium citrate-vitamin D 315 mg-5 mcg tablet, Take 1 tablet by mouth 2 (two) times a day (Patient not taking: Reported on 10/4/2023), Disp: , Rfl:     Cholecalciferol 25 MCG (1000 UT) capsule, Take 5,000 Units by mouth daily, Disp: , Rfl:     cyanocobalamin (VITAMIN B-12) 1000 MCG tablet, , Disp: , Rfl:     Glucosamine-Chondroitin 3389-7735 MG/30ML LIQD, , Disp: , Rfl:     levocetirizine (XYZAL) 5 MG tablet, Take 5 mg by mouth every evening, Disp: , Rfl:     Multiple Vitamins-Minerals (MULTIVITAMIN ADULT EXTRA C PO), Take 1 capsule by mouth, Disp: , Rfl:     Omega-3 Fatty Acids (Fish Oil) 1200 MG CAPS, , Disp: , Rfl:     traMADol (ULTRAM) 50 mg tablet, Take 1 tablet (50 mg total) by mouth every 8 (eight) hours as needed for moderate pain, Disp: 9 tablet, Rfl: 0    zaleplon (SONATA) 5 MG capsule, Take 5 mg by mouth daily as needed, Disp: , Rfl:   No Known Allergies   There were no vitals filed for this visit.

## 2023-12-05 NOTE — PROGRESS NOTES
I outreached Pt to ask if there were any questions or concerns after there Medical Oncology Consult. Pt states that she still has some swelling around the area where the Lumpectomy was done but she can notice that it is getting better every day. Pt also states that she had read information about the Anastrozole and the side effects and she is very concerned about taking the medication. Pt states that she is waiting for the results of her Genetic testing to come back and when she has all the final results she will talk to Dr Jayesh Skinner about the need for Anastrozole moving forward. Pt had no further questions or concerns at this time. I did encourage her to call if any were to arise.

## 2023-12-12 ENCOUNTER — PATIENT OUTREACH (OUTPATIENT)
Dept: HEMATOLOGY ONCOLOGY | Facility: CLINIC | Age: 63
End: 2023-12-12

## 2023-12-12 ENCOUNTER — TELEPHONE (OUTPATIENT)
Dept: HEMATOLOGY ONCOLOGY | Facility: CLINIC | Age: 63
End: 2023-12-12

## 2023-12-12 LAB — SCAN RESULT: NORMAL

## 2023-12-12 NOTE — PROGRESS NOTES
Patient returned [facility] phone call after speaking with Vadim Regalado RN regarding Low Oncotype score. Patient stated she is eager to start radiation since adjuvant chemotherapy is not indicated. Advised I will reach out to radiation schedulers on her behalf. Discussed any concerns and or barriers to care. Patient denies any concerns at this time. Provided patient with hopeline phone number as well as my phone number.

## 2023-12-12 NOTE — PROGRESS NOTES
Oncology Care Coordinator attempted to outreach patient to assess any questions or concerns after her medical oncology and radiation oncology consults. A voicemail was left stating a reason for my call and my contact information was provided . I requested a return call at patient's earliest convenience.

## 2023-12-12 NOTE — TELEPHONE ENCOUNTER
Phoned pt per Dr rudd's request to let her know that her oncotype Dx score came back very low so no need to discuss adjuvant chemo.  Pt appreciative of call

## 2023-12-19 PROCEDURE — 77263 THER RADIOLOGY TX PLNG CPLX: CPT | Performed by: RADIOLOGY

## 2023-12-21 ENCOUNTER — APPOINTMENT (OUTPATIENT)
Dept: RADIATION ONCOLOGY | Facility: CLINIC | Age: 63
End: 2023-12-21
Payer: COMMERCIAL

## 2023-12-21 PROCEDURE — 77290 THER RAD SIMULAJ FIELD CPLX: CPT | Performed by: RADIOLOGY

## 2023-12-21 PROCEDURE — 77332 RADIATION TREATMENT AID(S): CPT | Performed by: RADIOLOGY

## 2023-12-27 DIAGNOSIS — Z17.0 MALIGNANT NEOPLASM OF CENTRAL PORTION OF LEFT BREAST IN FEMALE, ESTROGEN RECEPTOR POSITIVE: ICD-10-CM

## 2023-12-27 DIAGNOSIS — C50.112 MALIGNANT NEOPLASM OF CENTRAL PORTION OF LEFT BREAST IN FEMALE, ESTROGEN RECEPTOR POSITIVE: ICD-10-CM

## 2023-12-28 PROCEDURE — 77334 RADIATION TREATMENT AID(S): CPT | Performed by: RADIOLOGY

## 2023-12-28 PROCEDURE — 77300 RADIATION THERAPY DOSE PLAN: CPT | Performed by: RADIOLOGY

## 2023-12-28 PROCEDURE — 77295 3-D RADIOTHERAPY PLAN: CPT | Performed by: RADIOLOGY

## 2023-12-28 RX ORDER — ANASTROZOLE 1 MG/1
1 TABLET ORAL DAILY
Qty: 90 TABLET | Refills: 4 | Status: SHIPPED | OUTPATIENT
Start: 2023-12-28

## 2024-01-02 ENCOUNTER — APPOINTMENT (OUTPATIENT)
Dept: RADIATION ONCOLOGY | Facility: CLINIC | Age: 64
End: 2024-01-02
Attending: RADIOLOGY
Payer: COMMERCIAL

## 2024-01-02 PROCEDURE — 77280 THER RAD SIMULAJ FIELD SMPL: CPT | Performed by: RADIOLOGY

## 2024-01-02 PROCEDURE — 77387 GUIDANCE FOR RADJ TX DLVR: CPT | Performed by: RADIOLOGY

## 2024-01-02 PROCEDURE — 77412 RADIATION TX DELIVERY LVL 3: CPT | Performed by: RADIOLOGY

## 2024-01-02 PROCEDURE — 77427 RADIATION TX MANAGEMENT X5: CPT | Performed by: RADIOLOGY

## 2024-01-04 ENCOUNTER — APPOINTMENT (OUTPATIENT)
Dept: RADIATION ONCOLOGY | Facility: CLINIC | Age: 64
End: 2024-01-04
Payer: COMMERCIAL

## 2024-01-04 PROCEDURE — 77412 RADIATION TX DELIVERY LVL 3: CPT | Performed by: RADIOLOGY

## 2024-01-04 PROCEDURE — 77014 CHG CT GUIDANCE RADIATION THERAPY FLDS PLACEMENT: CPT | Performed by: RADIOLOGY

## 2024-01-04 PROCEDURE — 77331 SPECIAL RADIATION DOSIMETRY: CPT | Performed by: RADIOLOGY

## 2024-01-04 PROCEDURE — 77387 GUIDANCE FOR RADJ TX DLVR: CPT | Performed by: RADIOLOGY

## 2024-01-08 ENCOUNTER — APPOINTMENT (OUTPATIENT)
Dept: RADIATION ONCOLOGY | Facility: CLINIC | Age: 64
End: 2024-01-08
Payer: COMMERCIAL

## 2024-01-08 PROCEDURE — 77412 RADIATION TX DELIVERY LVL 3: CPT | Performed by: RADIOLOGY

## 2024-01-08 PROCEDURE — 77387 GUIDANCE FOR RADJ TX DLVR: CPT | Performed by: RADIOLOGY

## 2024-01-08 PROCEDURE — 77014 CHG CT GUIDANCE RADIATION THERAPY FLDS PLACEMENT: CPT | Performed by: RADIOLOGY

## 2024-01-10 ENCOUNTER — APPOINTMENT (OUTPATIENT)
Dept: RADIATION ONCOLOGY | Facility: CLINIC | Age: 64
End: 2024-01-10
Payer: COMMERCIAL

## 2024-01-10 PROCEDURE — 77387 GUIDANCE FOR RADJ TX DLVR: CPT | Performed by: INTERNAL MEDICINE

## 2024-01-10 PROCEDURE — 77412 RADIATION TX DELIVERY LVL 3: CPT | Performed by: INTERNAL MEDICINE

## 2024-01-10 PROCEDURE — 77014 CHG CT GUIDANCE RADIATION THERAPY FLDS PLACEMENT: CPT | Performed by: INTERNAL MEDICINE

## 2024-01-12 ENCOUNTER — APPOINTMENT (OUTPATIENT)
Dept: RADIATION ONCOLOGY | Facility: CLINIC | Age: 64
End: 2024-01-12
Attending: RADIOLOGY
Payer: COMMERCIAL

## 2024-01-12 PROCEDURE — 77412 RADIATION TX DELIVERY LVL 3: CPT | Performed by: RADIOLOGY

## 2024-01-12 PROCEDURE — 77387 GUIDANCE FOR RADJ TX DLVR: CPT | Performed by: RADIOLOGY

## 2024-01-12 PROCEDURE — 77336 RADIATION PHYSICS CONSULT: CPT | Performed by: RADIOLOGY

## 2024-01-12 PROCEDURE — 77014 CHG CT GUIDANCE RADIATION THERAPY FLDS PLACEMENT: CPT | Performed by: RADIOLOGY

## 2024-01-15 NOTE — PROGRESS NOTES
Assessment/Plan:    Pt advised to reach out to breast care team about breast concerns.   ASCCP guidelines reviewed and pap with cotesting noted to be up to date; this low risk patient was advised she meets criteria to d/c pap screening at age 66. DEXA and colonoscopy noted to be up to date.  Reviewed diet/activity recommendations Calcium 1200 mg and Vit D 600-1000 IU daily.  Discussed postmenopausal considerations and symptoms to report. Kegel exercises as instructed. RTO in one year for routine annual gyn exam or sooner PRN.      Diagnoses and all orders for this visit:    History of breast cancer    Encounter for gynecological examination (general) (routine) without abnormal findings        Subjective:      Patient ID: Jocelin Cerrato is a 63 y.o. female.      This patient presents for routine annual gyn exam.  Undergoing tx for left breast cancer diagnosed in 2023. Left lumpectomy and sentinel node biopsy, 11/3/23. Pt just completed radiation and has left breast site discomfort.   Hx of genital herpes.  She denies  bleeding or spotting, VM sx, pelvic pain, breast concerns, abnormal discharge, bowel/bladder dysfunction, depression/anx.   Not sexually active.   Pap/HPV up to date and normal, 1/11/23. Osteoporosis screening normal 10/16/23. Colonoscopy 4/28/16.  Family hx of breast and colon cancer.           The following portions of the patient's history were reviewed and updated as appropriate: allergies, current medications, past family history, past medical history, past social history, past surgical history and problem list.    Review of Systems   Constitutional: Negative.    Respiratory: Negative.     Cardiovascular: Negative.    Gastrointestinal: Negative.    Endocrine: Negative.    Genitourinary:  Negative for dysuria, frequency, pelvic pain, urgency, vaginal bleeding, vaginal discharge and vaginal pain.   Musculoskeletal: Negative.    Skin: Negative.    Neurological: Negative.   "  Psychiatric/Behavioral: Negative.           Objective:      /80   Ht 5' 2\" (1.575 m)   Wt 74.4 kg (164 lb)   LMP  (LMP Unknown)   BMI 30.00 kg/m²          Physical Exam  Vitals and nursing note reviewed. Exam conducted with a chaperone present.   Constitutional:       Appearance: Normal appearance. She is well-developed.   HENT:      Head: Normocephalic and atraumatic.   Neck:      Thyroid: No thyroid mass or thyromegaly.   Cardiovascular:      Rate and Rhythm: Normal rate and regular rhythm.      Heart sounds: Normal heart sounds.   Pulmonary:      Effort: Pulmonary effort is normal.      Breath sounds: Normal breath sounds.   Chest:   Breasts:     Breasts are symmetrical.      Right: No inverted nipple, mass, nipple discharge, skin change or tenderness.      Left: Skin change (surgical scar noted) present. No inverted nipple, mass, nipple discharge or tenderness.   Abdominal:      General: Bowel sounds are normal.      Palpations: Abdomen is soft.      Tenderness: There is no abdominal tenderness.      Hernia: There is no hernia in the left inguinal area or right inguinal area.   Genitourinary:     General: Normal vulva.      Exam position: Supine.      Pubic Area: No rash.       Labia:         Right: No rash, tenderness, lesion or injury.         Left: No rash, tenderness, lesion or injury.       Urethra: No prolapse, urethral pain, urethral swelling or urethral lesion.      Vagina: Normal. No signs of injury and foreign body. No vaginal discharge, erythema, tenderness, bleeding, lesions or prolapsed vaginal walls.      Cervix: No cervical motion tenderness, discharge, friability, lesion, erythema, cervical bleeding or eversion.      Uterus: Not deviated, not enlarged, not fixed, not tender and no uterine prolapse.       Adnexa:         Right: No mass, tenderness or fullness.          Left: No mass, tenderness or fullness.        Rectum: No external hemorrhoid.      Comments: Urethra normal without " lesions  No bladder tenderness  Musculoskeletal:         General: Normal range of motion.      Cervical back: Normal range of motion and neck supple.   Lymphadenopathy:      Lower Body: No right inguinal adenopathy. No left inguinal adenopathy.   Skin:     General: Skin is warm and dry.   Neurological:      Mental Status: She is alert and oriented to person, place, and time.   Psychiatric:         Speech: Speech normal.         Behavior: Behavior normal. Behavior is cooperative.

## 2024-01-17 ENCOUNTER — ANNUAL EXAM (OUTPATIENT)
Dept: OBGYN CLINIC | Facility: CLINIC | Age: 64
End: 2024-01-17
Payer: COMMERCIAL

## 2024-01-17 VITALS
SYSTOLIC BLOOD PRESSURE: 140 MMHG | HEIGHT: 62 IN | DIASTOLIC BLOOD PRESSURE: 80 MMHG | BODY MASS INDEX: 30.18 KG/M2 | WEIGHT: 164 LBS

## 2024-01-17 DIAGNOSIS — Z85.3 HISTORY OF BREAST CANCER: Primary | ICD-10-CM

## 2024-01-17 DIAGNOSIS — Z01.419 ENCOUNTER FOR GYNECOLOGICAL EXAMINATION (GENERAL) (ROUTINE) WITHOUT ABNORMAL FINDINGS: ICD-10-CM

## 2024-01-17 PROCEDURE — S0612 ANNUAL GYNECOLOGICAL EXAMINA: HCPCS | Performed by: OBSTETRICS & GYNECOLOGY

## 2024-01-26 ENCOUNTER — TELEPHONE (OUTPATIENT)
Dept: HEMATOLOGY ONCOLOGY | Facility: CLINIC | Age: 64
End: 2024-01-26

## 2024-01-26 NOTE — TELEPHONE ENCOUNTER
Patient Call    Who are you speaking with? Patient    If it is not the patient, are they listed on an active communication consent form? N/A   What is the reason for this call? Patient would like a call back to go over some questions she has   Does this require a call back? Yes   If a call back is required, please list [facility] call back number [phone]   If a call back is required, advise that a message will be forwarded to their care team and someone will return their call as soon as possible.   Did you relay this information to the patient? Yes

## 2024-01-26 NOTE — TELEPHONE ENCOUNTER
Returned the patient's phone call to further discuss. The patient stated that above her surgical scar, she noticed a hard lump deep in her breast that feels like the size of a golf ball. She reported that she had an increase in pain at the site last week but it's much better now. The patient completed her radiation therapy 2 weeks ago. She stated that her breast feels heavy at times but this is alleviated with wearing a bra. Scheduled the patient for an appointment with LEV Sepulveda on 2/1 at 9:30 for further evaluation. The patient verified appointment details and was appreciative of the call back. She was instructed to contact the office if her symptoms worsened prior to that appointment. She was instructed to continue the use of good breast support as well as use warm compresses. The patient was in agreement with this plan.

## 2024-02-01 ENCOUNTER — OFFICE VISIT (OUTPATIENT)
Dept: SURGICAL ONCOLOGY | Facility: CLINIC | Age: 64
End: 2024-02-01
Payer: COMMERCIAL

## 2024-02-01 VITALS
DIASTOLIC BLOOD PRESSURE: 86 MMHG | HEIGHT: 62 IN | WEIGHT: 164 LBS | HEART RATE: 107 BPM | SYSTOLIC BLOOD PRESSURE: 138 MMHG | TEMPERATURE: 97.8 F | OXYGEN SATURATION: 96 % | BODY MASS INDEX: 30.18 KG/M2 | RESPIRATION RATE: 16 BRPM

## 2024-02-01 DIAGNOSIS — Z17.0 MALIGNANT NEOPLASM OF CENTRAL PORTION OF LEFT BREAST IN FEMALE, ESTROGEN RECEPTOR POSITIVE: ICD-10-CM

## 2024-02-01 DIAGNOSIS — N63.0 SWELLING OF BREAST: Primary | ICD-10-CM

## 2024-02-01 DIAGNOSIS — C50.112 MALIGNANT NEOPLASM OF CENTRAL PORTION OF LEFT BREAST IN FEMALE, ESTROGEN RECEPTOR POSITIVE: ICD-10-CM

## 2024-02-01 PROCEDURE — 99213 OFFICE O/P EST LOW 20 MIN: CPT

## 2024-02-01 NOTE — PROGRESS NOTES
"   Surgical Oncology Follow Up       [address] [facility]  [address]  [facility] [address]    Jocelin A Saqib  1960  [id number]  [address] [facility] [place]  [address]  [place] [address]    Chief Complaint   Patient presents with   • Follow-up       Assessment/Plan:  1. Swelling of breast  - warm compress, supportive bra     2. Malignant neoplasm of central portion of left breast in female, estrogen receptor positive        Discussion/Summary: Patient is a 64-year-old female presenting for swelling and fullness of the left breast. She is status post lumpectomy with sentinel node biopsy of the left breast in November 2023 with Dr. Del Toro. She completed whole breast radiation therapy 3 weeks ago.  She states that at times the left breast feels full and like she has a \"golf ball\" sized lump above her lumpectomy scar. She notes discomfort at times.  On clinical exam there were no signs of infection, cellulitis, or seroma.  I evaluated the site with bedside ultrasound and was only able to appreciate small amounts of scar tissue as well as slight lymphedema.  I recommend the patient use supportive bra and warm compress. She may also perform massage as tolerated to assist with the edema. She was appreciate of the recommendations. She may call with questions or concerns. All questions were answered.    History of Present Illness:     Oncology History   Malignant neoplasm of central portion of left breast in female, estrogen receptor positive    9/19/2023 -  Cancer Staged    Staging form: Breast, AJCC 8th Edition  - Clinical stage from 9/19/2023: Stage IA (cT1, cN0, cM0, G2, ER+, GA+, HER2-) - Signed by Flor Del Toro MD on 10/4/2023  Stage prefix: Initial diagnosis  Method of lymph node assessment: Clinical  Histologic grading system: 3 grade system       10/4/2023 Initial Diagnosis    Malignant neoplasm of central " "portion of left breast in female, estrogen receptor positive      11/3/2023 Surgery    Left breast lumpectomy, SLNB     11/3/2023 -  Cancer Staged    Staging form: Breast, AJCC 8th Edition  - Pathologic stage from 11/3/2023: Stage IA (pT1c, pN0(sn), cM0, G2, ER+, AR+, HER2-) - Signed by Flor Del Toro MD on 11/22/2023  Stage prefix: Initial diagnosis  Method of lymph node assessment: [place] lymph node biopsy  Histologic grading system: 3 grade system            -Interval History: Patient is a 64-year-old female presenting for swelling and fullness of the left breast. She states that at times the left breast feels full and like she has a \"golf ball\" sized lump above her lumpectomy scar. She notes discomfort at times. She masses, nodularity, skin changes, nipple changes or discharge.      Review of Systems:  Review of Systems   Constitutional:  Negative for activity change, appetite change, fatigue and unexpected weight change.   Respiratory:  Negative for cough and shortness of breath.    Cardiovascular:  Negative for chest pain.   Gastrointestinal:  Negative for abdominal pain, diarrhea, nausea and vomiting.   Endocrine: Negative for heat intolerance.   Musculoskeletal:  Negative for arthralgias, back pain and myalgias.   Skin:  Negative for rash.   Neurological:  Negative for weakness and headaches.   Hematological:  Negative for adenopathy.       Patient Active Problem List   Diagnosis   • Anal fissure   • Malignant neoplasm of central portion of left breast in female, estrogen receptor positive    • BRCA negative   • Anxiety   • Swelling of breast     Past Medical History:   Diagnosis Date   • Allergies    • Anal fissure    • Back pain     chronic   • Breast cancer (HCC)    • Endometriosis    • Environmental allergies    • Genital herpes simplex      Past Surgical History:   Procedure Laterality Date   • BREAST BIOPSY Left 09/19/2023   • BREAST LUMPECTOMY Left 11/3/2023    Procedure: LUMPECTOMY BREAST WYATT " LOCALIZED;  Surgeon: Flor Del Toro MD;  Location: [facility];  Service: Surgical Oncology   •  SECTION     • COLONOSCOPY     • HEMORROIDECTOMY     • LAPAROSCOPIC ENDOMETRIOSIS FULGURATION     • LYMPH NODE BIOPSY Left 11/3/2023    Procedure: BX LYMPH NODE SENTINEL, LYMPHATIC MAPPING, LYMPHOSCINTIGRAPHY;;  Surgeon: Flor Del Toro MD;  Location: [facility];  Service: Surgical Oncology   • VT SPHINCTEROTOMY ANAL DIVISION SPHINCTER SPX N/A 2016    Procedure: LEFT LATERAL CLOSED PARTIAL INTERNAL SPHINCTEROTOMY; FISSURECTOMY; POST ANAL ROTATING SKIN FLAP ANOPLASTY;  Surgeon: Miladis Bardales MD;  Location: [facility];  Service: Colorectal   • TONSILLECTOMY     • TUBAL LIGATION     • US GUIDED BREAST BIOPSY LEFT COMPLETE Left 2023   • WISDOM TOOTH EXTRACTION       Family History   Problem Relation Age of Onset   • Diabetes Mother         non insulin   • No Known Problems Father    • Skin cancer Brother 56   • Breast cancer Maternal Aunt         60's   • No Known Problems Paternal Aunt    • No Known Problems Paternal Aunt    • Cancer Maternal Grandmother         80's   • No Known Problems Maternal Grandfather    • Colon cancer Paternal Grandmother         50's   • Cancer Paternal Grandfather         unk type in his 90's   • Breast cancer Cousin         40's     Social History     Socioeconomic History   • Marital status:      Spouse name: Not on file   • Number of children: Not on file   • Years of education: Not on file   • Highest education level: Not on file   Occupational History   • Not on file   Tobacco Use   • Smoking status: Former     Current packs/day: 0.00     Types: Cigarettes     Start date:      Quit date:      Years since quittin.0   • Smokeless tobacco: Never   Vaping Use   • Vaping status: Never Used   Substance and Sexual Activity   • Alcohol use: Not Currently     Alcohol/week: 3.0 standard drinks of alcohol     Types: 3 Standard drinks or equivalent per week      Comment: Socially   • Drug use: Never   • Sexual activity: Never     Partners: Male     Birth control/protection: Post-menopausal   Other Topics Concern   • Not on file   Social History Narrative   • Not on file     Social Determinants of Health     Financial Resource Strain: Not on file   Food Insecurity: Not on file   Transportation Needs: Not on file   Physical Activity: Not on file   Stress: Not on file   Social Connections: Not on file   Intimate Partner Violence: Not on file   Housing Stability: Not on file       Current Outpatient Medications:   •  anastrozole (ARIMIDEX) 1 mg tablet, TAKE 1 TABLET BY MOUTH EVERY DAY, Disp: 90 tablet, Rfl: 4  •  buPROPion (WELLBUTRIN XL) 150 mg 24 hr tablet, Take by mouth, Disp: , Rfl:   •  Cholecalciferol 25 MCG (1000 UT) capsule, Take 5,000 Units by mouth daily, Disp: , Rfl:   •  cyanocobalamin (VITAMIN B-12) 1000 MCG tablet, , Disp: , Rfl:   •  Glucosamine-Chondroitin 1584-2561 MG/30ML LIQD, , Disp: , Rfl:   •  levocetirizine (XYZAL) 5 MG tablet, Take 5 mg by mouth every evening, Disp: , Rfl:   •  Multiple Vitamins-Minerals (MULTIVITAMIN ADULT EXTRA C PO), Take 1 capsule by mouth, Disp: , Rfl:   •  Omega-3 Fatty Acids (Fish Oil) 1200 MG CAPS, , Disp: , Rfl:   •  zaleplon (SONATA) 5 MG capsule, Take 5 mg by mouth daily as needed, Disp: , Rfl:   No Known Allergies  Vitals:    02/01/24 0934   BP: 138/86   Pulse: (!) 107   Resp: 16   Temp: 97.8 °F (36.6 °C)   SpO2: 96%       Physical Exam  Constitutional:       General: She is not in acute distress.     Appearance: Normal appearance.   Cardiovascular:      Rate and Rhythm: Normal rate and regular rhythm.      Pulses: Normal pulses.      Heart sounds: Normal heart sounds.   Pulmonary:      Effort: Pulmonary effort is normal.      Breath sounds: Normal breath sounds.   Chest:      Chest wall: No mass.   Breasts:     Right: No swelling, bleeding, inverted nipple, mass, nipple discharge, skin change or tenderness.      Left: No  swelling, bleeding, inverted nipple, mass, nipple discharge, skin change or tenderness.          Comments: Dependent left breast edema. No masses, nodularity, skin changes, nipple changes or discharge, or adenopathy appreciated on physical exam.     Abdominal:      General: Abdomen is flat.      Palpations: Abdomen is soft.   Lymphadenopathy:      Upper Body:      Right upper body: No supraclavicular, axillary or pectoral adenopathy.      Left upper body: No supraclavicular, axillary or pectoral adenopathy.   Skin:     General: Skin is warm.   Neurological:      General: No focal deficit present.      Mental Status: She is alert and oriented to person, place, and time.   Psychiatric:         Mood and Affect: Mood normal.         Behavior: Behavior normal.           Results:    Imaging  No results found.    I reviewed the above imaging data.      Advance Care Planning/Advance Directives:  Discussed disease status, cancer treatment plans and/or cancer treatment goals with the patient.

## 2024-03-07 ENCOUNTER — TELEPHONE (OUTPATIENT)
Dept: HEMATOLOGY ONCOLOGY | Facility: CLINIC | Age: 64
End: 2024-03-07

## 2024-03-07 NOTE — TELEPHONE ENCOUNTER
Left message for patient informing appointment rescheduled due to change in providers schedule, requested call back to confirm message received.

## 2024-03-14 ENCOUNTER — TELEPHONE (OUTPATIENT)
Dept: HEMATOLOGY ONCOLOGY | Facility: CLINIC | Age: 64
End: 2024-03-14

## 2024-03-14 NOTE — TELEPHONE ENCOUNTER
Appointment Change  Cancel, Reschedule, Change to Virtual      Who are you speaking with? Patient   If it is not the patient, is the caller listed on the communication consent form? N/A   Which provider is the appointment scheduled with? Dr. Rios   When was the original appointment scheduled?    Please list date and time 4/29/24 8:20am   At which location is the appointment scheduled to take place? [place]   Was the appointment rescheduled?     Was the appointment changed from an in person visit to a virtual visit?    If so, please list the details of the change. 3/18/24 2:00pm [place]   What is the reason for the appointment change? Patient is going to be out of town on the date her appointment was rescheduled for.  Patient states she called back the phone number that called her ([phone]) the day she received a call about her new appointment time and date, left a message for the office and never heard back to reschedule the appointment.    Patient requested to be seen at a different location due to Dr Rios's next available appointment being two months after her original rescheduled appointment and patient would also be out of town that week as well (5/24/24 in [place]).  Patient was given first available appointment at [place] office.  Patient was given address and confirmed this appointment is at [place], not the [place] office.  Patient verbalized her understanding and appreciation.        Was STAR transport scheduled? No   Does STAR transport need to be scheduled for the new visit (if applicable) No   Does the patient need an infusion appointment rescheduled? No   Does the patient have an upcoming infusion appointment scheduled? If so, when? No   Is the patient undergoing chemotherapy? No   For appointments cancelled with less than 24 hours:  Was the no-show policy reviewed? Yes

## 2024-03-15 ENCOUNTER — APPOINTMENT (OUTPATIENT)
Dept: LAB | Facility: CLINIC | Age: 64
End: 2024-03-15
Payer: COMMERCIAL

## 2024-03-15 DIAGNOSIS — Z17.0 MALIGNANT NEOPLASM OF CENTRAL PORTION OF LEFT BREAST IN FEMALE, ESTROGEN RECEPTOR POSITIVE (HCC): ICD-10-CM

## 2024-03-15 DIAGNOSIS — C50.112 MALIGNANT NEOPLASM OF CENTRAL PORTION OF LEFT BREAST IN FEMALE, ESTROGEN RECEPTOR POSITIVE (HCC): ICD-10-CM

## 2024-03-15 LAB
25(OH)D3 SERPL-MCNC: 64.1 NG/ML (ref 30–100)
ALBUMIN SERPL BCP-MCNC: 4.3 G/DL (ref 3.5–5)
ALP SERPL-CCNC: 74 U/L (ref 34–104)
ALT SERPL W P-5'-P-CCNC: 16 U/L (ref 7–52)
ANION GAP SERPL CALCULATED.3IONS-SCNC: 5 MMOL/L (ref 4–13)
AST SERPL W P-5'-P-CCNC: 19 U/L (ref 13–39)
BASOPHILS # BLD AUTO: 0.02 THOUSANDS/ÂΜL (ref 0–0.1)
BASOPHILS NFR BLD AUTO: 0 % (ref 0–1)
BILIRUB SERPL-MCNC: 0.44 MG/DL (ref 0.2–1)
BUN SERPL-MCNC: 19 MG/DL (ref 5–25)
CALCIUM SERPL-MCNC: 9.6 MG/DL (ref 8.4–10.2)
CHLORIDE SERPL-SCNC: 101 MMOL/L (ref 96–108)
CO2 SERPL-SCNC: 30 MMOL/L (ref 21–32)
CREAT SERPL-MCNC: 0.81 MG/DL (ref 0.6–1.3)
EOSINOPHIL # BLD AUTO: 0.08 THOUSAND/ÂΜL (ref 0–0.61)
EOSINOPHIL NFR BLD AUTO: 2 % (ref 0–6)
ERYTHROCYTE [DISTWIDTH] IN BLOOD BY AUTOMATED COUNT: 12.6 % (ref 11.6–15.1)
GFR SERPL CREATININE-BSD FRML MDRD: 77 ML/MIN/1.73SQ M
GLUCOSE P FAST SERPL-MCNC: 85 MG/DL (ref 65–99)
HCT VFR BLD AUTO: 41.1 % (ref 34.8–46.1)
HGB BLD-MCNC: 13.5 G/DL (ref 11.5–15.4)
IMM GRANULOCYTES # BLD AUTO: 0 THOUSAND/UL (ref 0–0.2)
IMM GRANULOCYTES NFR BLD AUTO: 0 % (ref 0–2)
LYMPHOCYTES # BLD AUTO: 1.74 THOUSANDS/ÂΜL (ref 0.6–4.47)
LYMPHOCYTES NFR BLD AUTO: 37 % (ref 14–44)
MCH RBC QN AUTO: 30.5 PG (ref 26.8–34.3)
MCHC RBC AUTO-ENTMCNC: 32.8 G/DL (ref 31.4–37.4)
MCV RBC AUTO: 93 FL (ref 82–98)
MONOCYTES # BLD AUTO: 0.35 THOUSAND/ÂΜL (ref 0.17–1.22)
MONOCYTES NFR BLD AUTO: 7 % (ref 4–12)
NEUTROPHILS # BLD AUTO: 2.56 THOUSANDS/ÂΜL (ref 1.85–7.62)
NEUTS SEG NFR BLD AUTO: 54 % (ref 43–75)
NRBC BLD AUTO-RTO: 0 /100 WBCS
PLATELET # BLD AUTO: 243 THOUSANDS/UL (ref 149–390)
PMV BLD AUTO: 11.1 FL (ref 8.9–12.7)
POTASSIUM SERPL-SCNC: 4.2 MMOL/L (ref 3.5–5.3)
PROT SERPL-MCNC: 6.9 G/DL (ref 6.4–8.4)
RBC # BLD AUTO: 4.42 MILLION/UL (ref 3.81–5.12)
SODIUM SERPL-SCNC: 136 MMOL/L (ref 135–147)
WBC # BLD AUTO: 4.75 THOUSAND/UL (ref 4.31–10.16)

## 2024-03-15 PROCEDURE — 85025 COMPLETE CBC W/AUTO DIFF WBC: CPT

## 2024-03-15 PROCEDURE — 36415 COLL VENOUS BLD VENIPUNCTURE: CPT

## 2024-03-15 PROCEDURE — 82306 VITAMIN D 25 HYDROXY: CPT

## 2024-03-15 PROCEDURE — 80053 COMPREHEN METABOLIC PANEL: CPT

## 2024-03-18 ENCOUNTER — OFFICE VISIT (OUTPATIENT)
Dept: HEMATOLOGY ONCOLOGY | Facility: CLINIC | Age: 64
End: 2024-03-18
Payer: COMMERCIAL

## 2024-03-18 VITALS
BODY MASS INDEX: 29.81 KG/M2 | RESPIRATION RATE: 18 BRPM | WEIGHT: 162 LBS | TEMPERATURE: 97.5 F | DIASTOLIC BLOOD PRESSURE: 78 MMHG | HEIGHT: 62 IN | HEART RATE: 101 BPM | SYSTOLIC BLOOD PRESSURE: 128 MMHG | OXYGEN SATURATION: 98 %

## 2024-03-18 DIAGNOSIS — C50.112 MALIGNANT NEOPLASM OF CENTRAL PORTION OF LEFT BREAST IN FEMALE, ESTROGEN RECEPTOR POSITIVE (HCC): Primary | ICD-10-CM

## 2024-03-18 DIAGNOSIS — Z79.811 AROMATASE INHIBITOR USE: ICD-10-CM

## 2024-03-18 DIAGNOSIS — Z17.0 MALIGNANT NEOPLASM OF CENTRAL PORTION OF LEFT BREAST IN FEMALE, ESTROGEN RECEPTOR POSITIVE (HCC): Primary | ICD-10-CM

## 2024-03-18 PROCEDURE — 99214 OFFICE O/P EST MOD 30 MIN: CPT | Performed by: INTERNAL MEDICINE

## 2024-03-18 NOTE — PROGRESS NOTES
Hematology/Oncology Outpatient Follow-up  Jocelin Cerrato 64 y.o. female 1960 [id number]    Date:  3/18/2024        Assessment and Plan:  1. Malignant neoplasm of central portion of left breast in female, estrogen receptor positive   Stage 1A (pT1c, pN0, cM0, Grade 2, ER:+ WI:+ HER-2: 1+) Left Breast Cancer .  The patient is tolerating the endocrine therapy relatively well.  She was asked to get screening mammograms on a yearly basis.  Will see her again in 6 months from now for close follow-up.  - CBC and differential; Future  - Comprehensive metabolic panel; Future  - Magnesium; Future    2. Aromatase inhibitor use  Her vitamin D level seems to be within normal range.  I did ask her to continue with the current dose of vitamin D.  She will be due for her next bone density in October 2025        HPI:  The patient came today for a follow-up visit.  She stated that she is tolerating the anastrozole relatively well.  Recent blood work on 3/15/2024 showed normal CBC and CMP.  Her vitamin D was 64.1.  Oncology History Overview Note   With h/o Stage IA ER/WI+, HER2- , left breast cancer.  Completed 5 fractions of RT to left breast on 1/12/24.  Today's visit is an EOT phone follow-up    2/1/24  [facility]  Seen for swelling in breast..  No signs of infection or seroma on exam.  Recommend support bra and warm compresses    Upcoming:  3/22/24  Dr. Rios  5/28/24  Surgical Oncology     Malignant neoplasm of central portion of left breast in female, estrogen receptor positive    9/19/2023 -  Cancer Staged    Staging form: Breast, AJCC 8th Edition  - Clinical stage from 9/19/2023: Stage IA (cT1, cN0, cM0, G2, ER+, WI+, HER2-) - Signed by Flor Del Toro MD on 10/4/2023  Stage prefix: Initial diagnosis  Method of lymph node assessment: Clinical  Histologic grading system: 3 grade system       10/4/2023 Initial Diagnosis    Malignant neoplasm of central portion of left breast in female, estrogen receptor  positive      11/3/2023 Surgery    Left breast lumpectomy, SLNB     11/3/2023 -  Cancer Staged    Staging form: Breast, AJCC 8th Edition  - Pathologic stage from 11/3/2023: Stage IA (pT1c, pN0(sn), cM0, G2, ER+, NE+, HER2-) - Signed by Flor Del Toro MD on 2023  Stage prefix: Initial diagnosis  Method of lymph node assessment: [place] lymph node biopsy  Histologic grading system: 3 grade system       2024 - 2024 Radiation      Plan ID Energy Fractions Dose per Fraction (cGy) Dose Correction (cGy) Total Dose Delivered (cGy) Elapsed Days   BH L APBI 6X 5 / 5 600 0 3,000 10      Treatment dates:  C1: 2024 - 2024             Interval history:    ROS: Review of Systems   Constitutional:  Negative for chills and fever.   HENT:  Negative for ear pain and sore throat.    Eyes:  Negative for pain and visual disturbance.   Respiratory:  Negative for cough and shortness of breath.    Cardiovascular:  Negative for chest pain and palpitations.   Gastrointestinal:  Negative for abdominal pain and vomiting.   Genitourinary:  Negative for dysuria and hematuria.   Musculoskeletal:  Negative for arthralgias and back pain.   Skin:  Negative for color change and rash.   Neurological:  Positive for numbness. Negative for seizures and syncope.   Psychiatric/Behavioral:  Positive for sleep disturbance.    All other systems reviewed and are negative.      Past Medical History:   Diagnosis Date    Allergies     Anal fissure     Back pain     chronic    Breast cancer (HCC)     Endometriosis     Environmental allergies     Genital herpes simplex        Past Surgical History:   Procedure Laterality Date    BREAST BIOPSY Left 2023    BREAST LUMPECTOMY Left 11/3/2023    Procedure: LUMPECTOMY BREAST WYATT LOCALIZED;  Surgeon: Flor Del Toro MD;  Location: [facility];  Service: Surgical Oncology     SECTION      COLONOSCOPY      HEMORROIDECTOMY      LAPAROSCOPIC ENDOMETRIOSIS FULGURATION      LYMPH NODE BIOPSY  Left 11/3/2023    Procedure: BX LYMPH NODE SENTINEL, LYMPHATIC MAPPING, LYMPHOSCINTIGRAPHY;;  Surgeon: Flor Del Toro MD;  Location: [facility];  Service: Surgical Oncology    ME SPHINCTEROTOMY ANAL DIVISION SPHINCTER SPX N/A 2016    Procedure: LEFT LATERAL CLOSED PARTIAL INTERNAL SPHINCTEROTOMY; FISSURECTOMY; POST ANAL ROTATING SKIN FLAP ANOPLASTY;  Surgeon: Miladis Bardales MD;  Location: [facility];  Service: Colorectal    TONSILLECTOMY      TUBAL LIGATION      US GUIDED BREAST BIOPSY LEFT COMPLETE Left 2023    WISDOM TOOTH EXTRACTION         Social History     Socioeconomic History    Marital status:      Spouse name: None    Number of children: None    Years of education: None    Highest education level: None   Occupational History    None   Tobacco Use    Smoking status: Former     Current packs/day: 0.00     Types: Cigarettes     Start date:      Quit date:      Years since quittin.2    Smokeless tobacco: Never   Vaping Use    Vaping status: Never Used   Substance and Sexual Activity    Alcohol use: Not Currently     Alcohol/week: 3.0 standard drinks of alcohol     Types: 3 Standard drinks or equivalent per week     Comment: Socially    Drug use: Never    Sexual activity: Never     Partners: Male     Birth control/protection: Post-menopausal   Other Topics Concern    None   Social History Narrative    None     Social Determinants of Health     Financial Resource Strain: Not on file   Food Insecurity: Not on file   Transportation Needs: Not on file   Physical Activity: Not on file   Stress: Not on file   Social Connections: Not on file   Intimate Partner Violence: Not on file   Housing Stability: Not on file       Family History   Problem Relation Age of Onset    Diabetes Mother         non insulin    No Known Problems Father     Skin cancer Brother 56    Breast cancer Maternal Aunt         60's    No Known Problems Paternal Aunt     No Known Problems Paternal Aunt     Cancer  "Maternal Grandmother         80's    No Known Problems Maternal Grandfather     Colon cancer Paternal Grandmother         50's    Cancer Paternal Grandfather         unk type in his 90's    Breast cancer Cousin         40's       No Known Allergies      Current Outpatient Medications:     anastrozole (ARIMIDEX) 1 mg tablet, TAKE 1 TABLET BY MOUTH EVERY DAY, Disp: 90 tablet, Rfl: 4    buPROPion (WELLBUTRIN XL) 150 mg 24 hr tablet, Take by mouth, Disp: , Rfl:     Cholecalciferol 25 MCG (1000 UT) capsule, Take 5,000 Units by mouth daily, Disp: , Rfl:     cyanocobalamin (VITAMIN B-12) 1000 MCG tablet, , Disp: , Rfl:     Glucosamine-Chondroitin 0495-5579 MG/30ML LIQD, , Disp: , Rfl:     levocetirizine (XYZAL) 5 MG tablet, Take 5 mg by mouth every evening, Disp: , Rfl:     Multiple Vitamins-Minerals (MULTIVITAMIN ADULT EXTRA C PO), Take 1 capsule by mouth, Disp: , Rfl:     Omega-3 Fatty Acids (Fish Oil) 1200 MG CAPS, , Disp: , Rfl:     zaleplon (SONATA) 5 MG capsule, Take 5 mg by mouth daily as needed, Disp: , Rfl:       Physical Exam:  /78 (BP Location: Left arm, Patient Position: Sitting, Cuff Size: Adult)   Pulse 101   Temp 97.5 °F (36.4 °C)   Resp 18   Ht 5' 2\" (1.575 m)   Wt 73.5 kg (162 lb)   LMP  (LMP Unknown)   SpO2 98%   BMI 29.63 kg/m²     Physical Exam  Constitutional:       General: She is not in acute distress.     Appearance: She is well-developed. She is not diaphoretic.   HENT:      Head: Normocephalic and atraumatic.      Nose: Nose normal.   Eyes:      General: No scleral icterus.        Right eye: No discharge.         Left eye: No discharge.      Conjunctiva/sclera: Conjunctivae normal.      Pupils: Pupils are equal, round, and reactive to light.   Neck:      Thyroid: No thyromegaly.      Vascular: No JVD.      Trachea: No tracheal deviation.   Cardiovascular:      Rate and Rhythm: Normal rate and regular rhythm.      Heart sounds: Normal heart sounds. No murmur heard.     No friction " rub.   Pulmonary:      Effort: Pulmonary effort is normal. No respiratory distress.      Breath sounds: Normal breath sounds. No stridor. No wheezing or rales.   Chest:      Chest wall: No tenderness.   Abdominal:      General: There is no distension.      Palpations: Abdomen is soft. There is no hepatomegaly or splenomegaly.      Tenderness: There is no abdominal tenderness. There is no guarding or rebound.   Musculoskeletal:         General: No tenderness or deformity. Normal range of motion.      Cervical back: Normal range of motion and neck supple.   Lymphadenopathy:      Cervical: No cervical adenopathy.   Skin:     General: Skin is warm and dry.      Coloration: Skin is not pale.      Findings: No erythema or rash.   Neurological:      Mental Status: She is alert and oriented to person, place, and time.      Cranial Nerves: No cranial nerve deficit.      Coordination: Coordination normal.      Deep Tendon Reflexes: Reflexes are normal and symmetric.   Psychiatric:         Behavior: Behavior normal.         Thought Content: Thought content normal.         Judgment: Judgment normal.           Labs:  Lab Results   Component Value Date    WBC 4.75 03/15/2024    HGB 13.5 03/15/2024    HCT 41.1 03/15/2024    MCV 93 03/15/2024     03/15/2024     Lab Results   Component Value Date    K 4.2 03/15/2024     03/15/2024    CO2 30 03/15/2024    BUN 19 03/15/2024    CREATININE 0.81 03/15/2024    GLUF 85 03/15/2024    CALCIUM 9.6 03/15/2024    AST 19 03/15/2024    ALT 16 03/15/2024    ALKPHOS 74 03/15/2024    EGFR 77 03/15/2024     Lab Results   Component Value Date    TSH 1.06 09/21/2022       Patient voiced understanding and agreement in the above discussion. Aware to contact our office with questions/symptoms in the interim.

## 2024-05-21 ENCOUNTER — TELEPHONE (OUTPATIENT)
Dept: SURGICAL ONCOLOGY | Facility: CLINIC | Age: 64
End: 2024-05-21

## 2024-05-28 ENCOUNTER — OFFICE VISIT (OUTPATIENT)
Dept: SURGICAL ONCOLOGY | Facility: CLINIC | Age: 64
End: 2024-05-28
Payer: COMMERCIAL

## 2024-05-28 VITALS
RESPIRATION RATE: 18 BRPM | TEMPERATURE: 98 F | WEIGHT: 157.8 LBS | HEIGHT: 62 IN | BODY MASS INDEX: 29.04 KG/M2 | SYSTOLIC BLOOD PRESSURE: 122 MMHG | DIASTOLIC BLOOD PRESSURE: 88 MMHG | OXYGEN SATURATION: 97 % | HEART RATE: 77 BPM

## 2024-05-28 DIAGNOSIS — Z17.0 MALIGNANT NEOPLASM OF CENTRAL PORTION OF LEFT BREAST IN FEMALE, ESTROGEN RECEPTOR POSITIVE (HCC): Primary | ICD-10-CM

## 2024-05-28 DIAGNOSIS — C50.112 MALIGNANT NEOPLASM OF CENTRAL PORTION OF LEFT BREAST IN FEMALE, ESTROGEN RECEPTOR POSITIVE (HCC): Primary | ICD-10-CM

## 2024-05-28 DIAGNOSIS — Z79.811 AROMATASE INHIBITOR USE: ICD-10-CM

## 2024-05-28 PROCEDURE — 99215 OFFICE O/P EST HI 40 MIN: CPT | Performed by: NURSE PRACTITIONER

## 2024-05-28 NOTE — PROGRESS NOTES
Assessment/Plan:    Diagnoses and all orders for this visit:    Malignant neoplasm of central portion of left breast in female, estrogen receptor positive (HCC)  -     Ambulatory referral to oncology social worker; Future  -     Mammo diagnostic bilateral w 3d & cad; Future  -     Ambulatory referral to Physical Therapy; Future    Aromatase inhibitor use      Patient is a 64-year-old female that was diagnosed with a left-sided breast cancer in September 2023.  Her pathology revealed invasive ductal carcinoma, grade 2, ER 90%, MS 80%, HER2 negative.  She underwent genetic testing which was negative.  She underwent a lumpectomy and sentinel node biopsy with Dr. Del Toro.  She completed adjuvant radiation therapy.  Oncotype score was 5.  She is now maintained on anastrozole.  Breast cancer survivorship visit performed today and treatment summary and care plan were reviewed with patient.  She offers no new complaints today and there are no worrisome findings on today's clinical exam.  Prescription given for mammogram due in September.  She is up-to-date on colorectal and cervical cancer screenings as well as osteoporosis screening.  We will plan to see her back in 6 months or sooner should the need arise.  She was instructed to contact us with any changes or concerns in the interim.  All of her questions were answered today.    REASON FOR VISIT:   Survivorship      Previous therapy:  Oncology History   Malignant neoplasm of central portion of left breast in female, estrogen receptor positive (HCC)   9/19/2023 Biopsy    Left breast biopsy 12:00 3cmfn:  - Invasive ductal carcinoma  Grade 2  ER 90%  MS 80%  HER2 negative     9/19/2023 -  Cancer Staged    Staging form: Breast, AJCC 8th Edition  - Clinical stage from 9/19/2023: Stage IA (cT1, cN0, cM0, G2, ER+, MS+, HER2-) - Signed by Flor Del Toro MD on 10/4/2023  Stage prefix: Initial diagnosis  Method of lymph node assessment: Clinical  Histologic grading system: 3 grade  system       10/2/2023 Genetic Testing     [facility] BRCAplus STAT Panel (8 genes): NUZHAT, BRCA1, BRCA2, CDH1, CHEK2, PALB2, PTEN, TP53 with reflex to [facility] CustomNext Cancer Panel + RNA (47 genes): APC, AXIN2, BARD1, BMPR1A, BRIP1, CDK4, CDKN2A, CTNNA1, DICER1, EPCAM, GREM1, HOXB13, KIT, MEN1, MLH1, MSH2, MSH3, MSH6, MUTYH, NBN, NF1, NTHL1, PDGFRA, PMS2, POLD1, POLE, RAD50, RAD51C, RAD51D, SDHA, SDHB, SDHC, SDHD, SMAD4, SMARCA4, STK11, TSC1, TSC2, VHL     Result: Negative      11/3/2023 Surgery    Left breast lumpectomy with sentinel lymph node biopsy:  - clear margins  - 0/2 lymph nodes    Dr. Del Toro     11/3/2023 -  Cancer Staged    Staging form: Breast, AJCC 8th Edition  - Pathologic stage from 11/3/2023: Stage IA (pT1c, pN0(sn), cM0, G2, ER+, VA+, HER2-) - Signed by Flor Del Toro MD on 11/22/2023  Stage prefix: Initial diagnosis  Method of lymph node assessment: [place] lymph node biopsy  Histologic grading system: 3 grade system       12/2023 -  Hormone Therapy    Anastrozole  Dr. Rios     12/2023 Genomic Testing    Oncotype DX: 5 low risk     1/2/2024 - 1/12/2024 Radiation      Plan ID Energy Fractions Dose per Fraction (cGy) Dose Correction (cGy) Total Dose Delivered (cGy) Elapsed Days   BH L APBI 6X 5 / 5 600 0 3,000 10      Dr. Nguyen           Patient ID: Jocelin Cerrato is a 64 y.o. female  Presenting today for a breast cancer survivorship visit.  She has completed adjuvant radiation therapy and is currently taking anastrozole.  She reports an occasional hot flash but is otherwise tolerating the medication very well.  She denies persistent headaches, back pain or bone pain, cough or shortness of breath, abdominal pain.          Review of Systems   Constitutional:  Negative for activity change, appetite change, chills, fatigue, fever and unexpected weight change.   Respiratory:  Negative for cough and shortness of breath.    Cardiovascular:  Negative for chest pain.   Gastrointestinal:  Negative for abdominal  "pain, constipation, diarrhea, nausea and vomiting.   Musculoskeletal:  Negative for arthralgias, back pain, gait problem and myalgias.   Skin:  Negative for color change and rash.   Neurological:  Negative for dizziness and headaches.   Hematological:  Negative for adenopathy.   Psychiatric/Behavioral:  Negative for agitation and confusion.    All other systems reviewed and are negative.      Objective:    Blood pressure 122/88, pulse 77, temperature 98 °F (36.7 °C), temperature source Temporal, resp. rate 18, height 5' 2\" (1.575 m), weight 71.6 kg (157 lb 12.8 oz), SpO2 97%.  Body mass index is 28.86 kg/m².      Physical Exam  Vitals reviewed.   Constitutional:       General: She is not in acute distress.     Appearance: Normal appearance. She is well-developed. She is not diaphoretic.   HENT:      Head: Normocephalic and atraumatic.   Cardiovascular:      Rate and Rhythm: Normal rate and regular rhythm.      Heart sounds: Normal heart sounds.   Pulmonary:      Effort: Pulmonary effort is normal.      Breath sounds: Normal breath sounds.   Chest:   Breasts:     Right: No swelling, bleeding, inverted nipple, mass, nipple discharge, skin change or tenderness.      Left: Skin change (surgical scars) present. No swelling, bleeding, inverted nipple, mass, nipple discharge or tenderness.   Abdominal:      Palpations: Abdomen is soft. There is no mass.      Tenderness: There is no abdominal tenderness.   Musculoskeletal:         General: Normal range of motion.      Cervical back: Normal range of motion.   Lymphadenopathy:      Upper Body:      Right upper body: No supraclavicular or axillary adenopathy.      Left upper body: No supraclavicular or axillary adenopathy.   Skin:     General: Skin is warm and dry.      Findings: No rash.   Neurological:      Mental Status: She is alert and oriented to person, place, and time.   Psychiatric:         Speech: Speech normal.         Discussed symptoms related to disease " recurrence, Yes    Evaluated for late effects related to cancer treatment, Yes, describe: discussed effects of surgery, RT, AI therapy    Screening current for cervical cancer, Yes, describe: pap 1/2023    Screening current for colon cancer, Yes, describe: colonoscopy 10/2021- repeat in 5-10 years    Cancer rehabilitation services addressed, Yes, describe: referral placed to [facility]    Screening current for osteoporosis, Yes, describe: dxa 10/2023    Oncology Treatment Summary reviewed with patient and copy provided, Yes    Referral placed for psychosocial evaluation/screening to oncology social work  Yes    I have spent 45 minutes with Patient  today in which greater than 50% of this time was spent in counseling/coordination of care regarding breast cancer survivorship.

## 2024-05-30 ENCOUNTER — PATIENT OUTREACH (OUTPATIENT)
Dept: CASE MANAGEMENT | Facility: HOSPITAL | Age: 64
End: 2024-05-30

## 2024-07-01 ENCOUNTER — EVALUATION (OUTPATIENT)
Dept: PHYSICAL THERAPY | Facility: REHABILITATION | Age: 64
End: 2024-07-01
Payer: COMMERCIAL

## 2024-07-01 DIAGNOSIS — Z17.0 MALIGNANT NEOPLASM OF CENTRAL PORTION OF LEFT BREAST IN FEMALE, ESTROGEN RECEPTOR POSITIVE (HCC): ICD-10-CM

## 2024-07-01 DIAGNOSIS — C50.112 MALIGNANT NEOPLASM OF CENTRAL PORTION OF LEFT BREAST IN FEMALE, ESTROGEN RECEPTOR POSITIVE (HCC): ICD-10-CM

## 2024-07-01 PROCEDURE — 97161 PT EVAL LOW COMPLEX 20 MIN: CPT | Performed by: PHYSICAL THERAPIST

## 2024-07-01 NOTE — PROGRESS NOTES
PT Evaluation     Today's date: 2024  Patient name: Jocelin Cerrato  : 1960  MRN: [id number]  Referring provider: Ronal Hernandez CRNP  Dx:   Encounter Diagnosis     ICD-10-CM    1. Malignant neoplasm of central portion of left breast in female, estrogen receptor positive (HCC)  C50.112 Ambulatory referral to Physical Therapy    Z17.0           Start Time: 1020          Assessment  Impairments: lacks appropriate home exercise program    Assessment details: Patient has 4% difference in volume LLE compared to RLE and 2% increase in volume RUE compared to LUE which is within the standard deviation of 5% of normal differences. Would benefit from compression to LLE.     Goals  STG control of edema with compression garment  I HEP    Plan  Patient would benefit from: skilled physical therapy    Planned therapy interventions: stretching and strengthening    Frequency: 1x week  Duration in weeks: 4  Treatment plan discussed with: patient  Plan details: Continue with [facility] program next visit        Subjective Evaluation    History of Present Illness  Mechanism of injury: Patient underwent left lumpectomy in November and presents for [facility] program  Patient Goals  Patient goals for therapy: increased strength  Patient goal: I HEP  Pain  No pain reported          Objective     Active Range of Motion   Left Shoulder   Normal active range of motion    Right Shoulder   Normal active range of motion             Precautions: left lumpectomy      Manuals             Evaluation for [facility]                                                    Neuro Re-Ed                                                                                                        Ther Ex                                                                                                                     Ther Activity                                       Gait Training                                       Modalities

## 2024-07-08 DIAGNOSIS — I89.0 LYMPHEDEMA: Primary | ICD-10-CM

## 2024-07-10 ENCOUNTER — OFFICE VISIT (OUTPATIENT)
Dept: PHYSICAL THERAPY | Facility: REHABILITATION | Age: 64
End: 2024-07-10
Payer: COMMERCIAL

## 2024-07-10 DIAGNOSIS — C50.112 MALIGNANT NEOPLASM OF CENTRAL PORTION OF LEFT BREAST IN FEMALE, ESTROGEN RECEPTOR POSITIVE (HCC): Primary | ICD-10-CM

## 2024-07-10 DIAGNOSIS — Z17.0 MALIGNANT NEOPLASM OF CENTRAL PORTION OF LEFT BREAST IN FEMALE, ESTROGEN RECEPTOR POSITIVE (HCC): Primary | ICD-10-CM

## 2024-07-10 PROCEDURE — 97110 THERAPEUTIC EXERCISES: CPT | Performed by: PHYSICAL THERAPIST

## 2024-07-10 PROCEDURE — 97112 NEUROMUSCULAR REEDUCATION: CPT | Performed by: PHYSICAL THERAPIST

## 2024-07-10 NOTE — PROGRESS NOTES
Daily Note     Today's date: 7/10/2024  Patient name: Jocelin Cerrato  : 1960  MRN: [id number]  Referring provider: Ronal Hernandez CRNP  Dx:   Encounter Diagnosis     ICD-10-CM    1. Malignant neoplasm of central portion of left breast in female, estrogen receptor positive (HCC)  C50.112     Z17.0           Start Time: 1219          Subjective: I ordered the compression for my leg      Objective: See treatment diary below      Assessment: Tolerated treatment well. Patient exhibited good technique with therapeutic exercises      Plan: Continue per plan of care.      Precautions: left lumpectomy      Manuals 7/1 7/10           Evaluation for [facility]                                                    Neuro Re-Ed             bridges  X5 with 5 sechold           crunch  X5 with 5 sec  hold           Super woman  x5 with 5 sec hold                                                               Ther Ex             Chest stretch  X5 with 15 sec hold           Shoulder stretch  X5 with 15 sec hold           Tricep stretch  X5 with 15 sec hold           Calf stretch  X5 with 15 sec hold           Quad stretch kneeling on chair  X5 with 15 sec hold           Butterfly stretch  X 5 with 15 sec hold           Hamstring stretch  X5 with 15 sec hold           Back stretch  X5 with 15 sechold           Chest press  nv           squats  nv           Dead lift  nv           scaption  nv           steps  nv           Tricep kickback  nv           Calf raises  nv           Bicep curls  nv           Ther Activity                                       Gait Training                                       Modalities

## 2024-07-15 ENCOUNTER — OFFICE VISIT (OUTPATIENT)
Dept: PHYSICAL THERAPY | Facility: REHABILITATION | Age: 64
End: 2024-07-15
Payer: COMMERCIAL

## 2024-07-15 DIAGNOSIS — Z17.0 MALIGNANT NEOPLASM OF CENTRAL PORTION OF LEFT BREAST IN FEMALE, ESTROGEN RECEPTOR POSITIVE (HCC): Primary | ICD-10-CM

## 2024-07-15 DIAGNOSIS — C50.112 MALIGNANT NEOPLASM OF CENTRAL PORTION OF LEFT BREAST IN FEMALE, ESTROGEN RECEPTOR POSITIVE (HCC): Primary | ICD-10-CM

## 2024-07-15 PROCEDURE — 97110 THERAPEUTIC EXERCISES: CPT | Performed by: PHYSICAL THERAPIST

## 2024-07-15 NOTE — PROGRESS NOTES
Daily Note     Today's date: 7/15/2024  Patient name: Jocelin Cerrato  : 1960  MRN: [id number]  Referring provider: Ronal Hernandez CRNP  Dx:   Encounter Diagnosis     ICD-10-CM    1. Malignant neoplasm of central portion of left breast in female, estrogen receptor positive (HCC)  C50.112     Z17.0                      Subjective: I had some tingling with the first stretch      Objective: See treatment diary below      Assessment: Tolerated treatment well. Patient exhibited good technique with therapeutic exercises      Plan:  DC from skilled care with patient on full hep     Precautions: left lumpectomy      Manuals 7/1 7/10 7/15          Evaluation for [facility]                                                    Neuro Re-Ed             bridges  X5 with 5 sechold           crunch  X5 with 5 sec  hold           Super woman  x5 with 5 sec hold                                                               Ther Ex             Chest stretch  X5 with 15 sec hold           Shoulder stretch  X5 with 15 sec hold           Tricep stretch  X5 with 15 sec hold           Calf stretch  X5 with 15 sec hold           Quad stretch kneeling on chair  X5 with 15 sec hold           Butterfly stretch  X 5 with 15 sec hold           Hamstring stretch  X5 with 15 sec hold           Back stretch  X5 with 15 sechold           Chest press  nv 1# x 10          squats  nv 1# x 10          row   1# x 10          Dead lift  nv 1# x 10          scaption  nv 1# x 10          steps  nv 1# x 10          Tricep kickback  nv 1# x 10          Calf raises  nv 1# x 10          Bicep curls  nv 1# x 10          Ther Activity                                       Gait Training                                       Modalities

## 2024-07-24 ENCOUNTER — APPOINTMENT (OUTPATIENT)
Dept: PHYSICAL THERAPY | Facility: REHABILITATION | Age: 64
End: 2024-07-24
Payer: COMMERCIAL

## 2024-08-06 ENCOUNTER — PATIENT OUTREACH (OUTPATIENT)
Dept: CASE MANAGEMENT | Facility: OTHER | Age: 64
End: 2024-08-06

## 2024-08-14 ENCOUNTER — PATIENT OUTREACH (OUTPATIENT)
Dept: CASE MANAGEMENT | Facility: OTHER | Age: 64
End: 2024-08-14

## 2024-08-16 ENCOUNTER — PATIENT OUTREACH (OUTPATIENT)
Dept: CASE MANAGEMENT | Facility: OTHER | Age: 64
End: 2024-08-16

## 2024-08-16 NOTE — PROGRESS NOTES
Biopsychosocial and Barriers Assessment Survivorship     Type of Cancer: Breast  Treatment plan: Completed radiation and maintained on Anastrozole  Noted barriers to care: none  Cultural/Latter-day concerns: none, strong bryant  Hair Loss/ Wig resources needed: none    DT completed: yes  DT score: 2/10  Issues noted: none    Marital status/Lives with: Alone  Pt's support system: Family, friends and God  Mental Health history: none  Substance Abuse: none    Employment/income source: retired  Concerns with bills (treatment vs household): none  Noted issues with home: none    Narrative note:  OSW received a return TC from pt this afternoon. OSW introduced self and role. Pt is a very pleasant sheridan who is in survivorship. She completed a Dt, where she scored a 2/10. She reports some sleeping issues and at times loneliness. Overall she is well supported and has a very strong bryant. She thanked this writer for the outreach.

## 2024-09-18 ENCOUNTER — HOSPITAL ENCOUNTER (OUTPATIENT)
Dept: MAMMOGRAPHY | Facility: CLINIC | Age: 64
Discharge: HOME | End: 2024-09-18
Payer: COMMERCIAL

## 2024-09-18 DIAGNOSIS — C50.112 MALIGNANT NEOPLASM OF CENTRAL PORTION OF LEFT BREAST IN FEMALE, ESTROGEN RECEPTOR POSITIVE (HCC): ICD-10-CM

## 2024-09-18 DIAGNOSIS — Z17.0 MALIGNANT NEOPLASM OF CENTRAL PORTION OF LEFT BREAST IN FEMALE, ESTROGEN RECEPTOR POSITIVE (HCC): ICD-10-CM

## 2024-09-18 PROCEDURE — G0279 TOMOSYNTHESIS, MAMMO: HCPCS

## 2024-09-18 PROCEDURE — 77066 DX MAMMO INCL CAD BI: CPT

## 2024-11-12 ENCOUNTER — APPOINTMENT (OUTPATIENT)
Dept: LAB | Facility: CLINIC | Age: 64
End: 2024-11-12
Payer: COMMERCIAL

## 2024-11-12 DIAGNOSIS — C50.112 MALIGNANT NEOPLASM OF CENTRAL PORTION OF LEFT BREAST IN FEMALE, ESTROGEN RECEPTOR POSITIVE (HCC): ICD-10-CM

## 2024-11-12 DIAGNOSIS — Z17.0 MALIGNANT NEOPLASM OF CENTRAL PORTION OF LEFT BREAST IN FEMALE, ESTROGEN RECEPTOR POSITIVE (HCC): ICD-10-CM

## 2024-11-12 LAB
ALBUMIN SERPL BCG-MCNC: 4.4 G/DL (ref 3.5–5)
ALP SERPL-CCNC: 74 U/L (ref 34–104)
ALT SERPL W P-5'-P-CCNC: 18 U/L (ref 7–52)
ANION GAP SERPL CALCULATED.3IONS-SCNC: 3 MMOL/L (ref 4–13)
AST SERPL W P-5'-P-CCNC: 19 U/L (ref 13–39)
BASOPHILS # BLD AUTO: 0.04 THOUSANDS/ÂΜL (ref 0–0.1)
BASOPHILS NFR BLD AUTO: 1 % (ref 0–1)
BILIRUB SERPL-MCNC: 0.46 MG/DL (ref 0.2–1)
BUN SERPL-MCNC: 18 MG/DL (ref 5–25)
CALCIUM SERPL-MCNC: 9.6 MG/DL (ref 8.4–10.2)
CHLORIDE SERPL-SCNC: 102 MMOL/L (ref 96–108)
CO2 SERPL-SCNC: 33 MMOL/L (ref 21–32)
CREAT SERPL-MCNC: 0.89 MG/DL (ref 0.6–1.3)
EOSINOPHIL # BLD AUTO: 0.17 THOUSAND/ÂΜL (ref 0–0.61)
EOSINOPHIL NFR BLD AUTO: 3 % (ref 0–6)
ERYTHROCYTE [DISTWIDTH] IN BLOOD BY AUTOMATED COUNT: 12.1 % (ref 11.6–15.1)
GFR SERPL CREATININE-BSD FRML MDRD: 69 ML/MIN/1.73SQ M
GLUCOSE P FAST SERPL-MCNC: 86 MG/DL (ref 65–99)
HCT VFR BLD AUTO: 41.8 % (ref 34.8–46.1)
HGB BLD-MCNC: 13.5 G/DL (ref 11.5–15.4)
IMM GRANULOCYTES # BLD AUTO: 0.01 THOUSAND/UL (ref 0–0.2)
IMM GRANULOCYTES NFR BLD AUTO: 0 % (ref 0–2)
LYMPHOCYTES # BLD AUTO: 2.03 THOUSANDS/ÂΜL (ref 0.6–4.47)
LYMPHOCYTES NFR BLD AUTO: 38 % (ref 14–44)
MAGNESIUM SERPL-MCNC: 2.2 MG/DL (ref 1.9–2.7)
MCH RBC QN AUTO: 30.8 PG (ref 26.8–34.3)
MCHC RBC AUTO-ENTMCNC: 32.3 G/DL (ref 31.4–37.4)
MCV RBC AUTO: 95 FL (ref 82–98)
MONOCYTES # BLD AUTO: 0.36 THOUSAND/ÂΜL (ref 0.17–1.22)
MONOCYTES NFR BLD AUTO: 7 % (ref 4–12)
NEUTROPHILS # BLD AUTO: 2.75 THOUSANDS/ÂΜL (ref 1.85–7.62)
NEUTS SEG NFR BLD AUTO: 51 % (ref 43–75)
NRBC BLD AUTO-RTO: 0 /100 WBCS
PLATELET # BLD AUTO: 257 THOUSANDS/UL (ref 149–390)
PMV BLD AUTO: 11.2 FL (ref 8.9–12.7)
POTASSIUM SERPL-SCNC: 4.7 MMOL/L (ref 3.5–5.3)
PROT SERPL-MCNC: 7.1 G/DL (ref 6.4–8.4)
RBC # BLD AUTO: 4.38 MILLION/UL (ref 3.81–5.12)
SODIUM SERPL-SCNC: 138 MMOL/L (ref 135–147)
WBC # BLD AUTO: 5.36 THOUSAND/UL (ref 4.31–10.16)

## 2024-11-12 PROCEDURE — 85025 COMPLETE CBC W/AUTO DIFF WBC: CPT

## 2024-11-12 PROCEDURE — 80053 COMPREHEN METABOLIC PANEL: CPT

## 2024-11-12 PROCEDURE — 36415 COLL VENOUS BLD VENIPUNCTURE: CPT

## 2024-11-12 PROCEDURE — 83735 ASSAY OF MAGNESIUM: CPT

## 2024-11-21 ENCOUNTER — ONCOLOGY SURVIVORSHIP (OUTPATIENT)
Dept: SURGICAL ONCOLOGY | Facility: CLINIC | Age: 64
End: 2024-11-21
Payer: COMMERCIAL

## 2024-11-21 VITALS
RESPIRATION RATE: 16 BRPM | HEIGHT: 62 IN | BODY MASS INDEX: 29.37 KG/M2 | DIASTOLIC BLOOD PRESSURE: 86 MMHG | OXYGEN SATURATION: 97 % | HEART RATE: 74 BPM | WEIGHT: 159.6 LBS | SYSTOLIC BLOOD PRESSURE: 128 MMHG | TEMPERATURE: 97.4 F

## 2024-11-21 DIAGNOSIS — Z17.0 MALIGNANT NEOPLASM OF CENTRAL PORTION OF LEFT BREAST IN FEMALE, ESTROGEN RECEPTOR POSITIVE (HCC): Primary | ICD-10-CM

## 2024-11-21 DIAGNOSIS — C50.112 MALIGNANT NEOPLASM OF CENTRAL PORTION OF LEFT BREAST IN FEMALE, ESTROGEN RECEPTOR POSITIVE (HCC): Primary | ICD-10-CM

## 2024-11-21 DIAGNOSIS — Z79.811 AROMATASE INHIBITOR USE: ICD-10-CM

## 2024-11-21 PROCEDURE — 99213 OFFICE O/P EST LOW 20 MIN: CPT | Performed by: NURSE PRACTITIONER

## 2024-11-21 RX ORDER — FLUTICASONE PROPIONATE 50 MCG
SPRAY, SUSPENSION (ML) NASAL
COMMUNITY
Start: 2024-10-25

## 2024-11-21 RX ORDER — AZELASTINE HYDROCHLORIDE 137 UG/1
SPRAY, METERED NASAL
COMMUNITY
Start: 2024-10-24

## 2024-11-21 NOTE — PROGRESS NOTES
Assessment/Plan:    Diagnoses and all orders for this visit:    Malignant neoplasm of central portion of left breast in female, estrogen receptor positive (HCC)    Aromatase inhibitor use      Patient is a 64-year-old female that was diagnosed with a left-sided breast cancer in September 2023.  Her pathology revealed invasive ductal carcinoma, grade 2, ER 90%, DE 80%, HER2 negative.  She underwent genetic testing which was negative.  She underwent a lumpectomy and sentinel node biopsy with Dr. Del Toro. She completed adjuvant radiation therapy.  Oncotype score was 5.  She is now maintained on anastrozole.  Follow up breast cancer survivorship visit performed today. She had a bilateral mammogram on in September 2024 which was BIRADS 2, category 3 density. She offers no new complaints today and there are no worrisome findings on today's clinical exam.  She is up-to-date on colorectal and cervical cancer screenings as well as osteoporosis screening.  We will plan to see her back in 6 months or sooner should the need arise.  She was instructed to contact us with any changes or concerns in the interim.  All of her questions were answered today.     REASON FOR VISIT:   Survivorship      Previous therapy:  Oncology History   Malignant neoplasm of central portion of left breast in female, estrogen receptor positive (HCC)   9/19/2023 Biopsy    Left breast biopsy 12:00 3cmfn:  - Invasive ductal carcinoma  Grade 2  ER 90%  DE 80%  HER2 negative     9/19/2023 -  Cancer Staged    Staging form: Breast, AJCC 8th Edition  - Clinical stage from 9/19/2023: Stage IA (cT1, cN0, cM0, G2, ER+, DE+, HER2-) - Signed by Flor Del Toro MD on 10/4/2023  Stage prefix: Initial diagnosis  Method of lymph node assessment: Clinical  Histologic grading system: 3 grade system       10/2/2023 Genetic Testing     [employer] BRCAplus STAT Panel (8 genes): NUZHAT, BRCA1, BRCA2, CDH1, CHEK2, PALB2, PTEN, TP53 with reflex to [employer] [facility] CustomNext Cancer Panel + RNA (47  genes): APC, AXIN2, BARD1, BMPR1A, BRIP1, CDK4, CDKN2A, CTNNA1, DICER1, EPCAM, GREM1, HOXB13, KIT, MEN1, MLH1, MSH2, MSH3, MSH6, MUTYH, NBN, NF1, NTHL1, PDGFRA, PMS2, POLD1, POLE, RAD50, RAD51C, RAD51D, SDHA, SDHB, SDHC, SDHD, SMAD4, SMARCA4, STK11, TSC1, TSC2, VHL     Result: Negative      11/3/2023 Surgery    Left breast lumpectomy with sentinel lymph node biopsy:  - clear margins  - 0/2 lymph nodes    Dr. Del Toro     11/3/2023 -  Cancer Staged    Staging form: Breast, AJCC 8th Edition  - Pathologic stage from 11/3/2023: Stage IA (pT1c, pN0(sn), cM0, G2, ER+, VA+, HER2-) - Signed by Flor Del Toro MD on 11/22/2023  Stage prefix: Initial diagnosis  Method of lymph node assessment: [place] lymph node biopsy  Histologic grading system: 3 grade system       12/2023 -  Hormone Therapy    Anastrozole  Dr. Rios     12/2023 Genomic Testing    Oncotype DX: 5 low risk     1/2/2024 - 1/12/2024 Radiation      Plan ID Energy Fractions Dose per Fraction (cGy) Dose Correction (cGy) Total Dose Delivered (cGy) Elapsed Days   BH L APBI 6X 5 / 5 600 0 3,000 10      Dr. Nguyen           Patient ID: Jocelin Cerrato is a 64 y.o. female  Presenting today for a breast cancer survivorship visit.  She has not appreciated any changes on self breast exam.  She had a bilateral mammogram in September which was benign.  She continues on anastrozole.  Reports an occasional hot flash but otherwise denies any new or persistent headaches, back pain or bone pain, cough or shortness of breath, abdominal pain.          Review of Systems   Constitutional:  Negative for activity change, appetite change, chills, fatigue, fever and unexpected weight change.   Respiratory:  Negative for cough and shortness of breath.    Cardiovascular:  Negative for chest pain.   Gastrointestinal:  Negative for abdominal pain, constipation, diarrhea, nausea and vomiting.   Musculoskeletal:  Negative for arthralgias, back pain, gait problem and myalgias.   Skin:  Negative for  "color change and rash.   Neurological:  Negative for dizziness and headaches.   Hematological:  Negative for adenopathy.   Psychiatric/Behavioral:  Negative for agitation and confusion.    All other systems reviewed and are negative.      Objective:    Height 5' 2\" (1.575 m), weight 72.4 kg (159 lb 9.6 oz).  Body mass index is 29.19 kg/m².      Physical Exam  Vitals reviewed.   Constitutional:       General: She is not in acute distress.     Appearance: Normal appearance. She is well-developed. She is not diaphoretic.   HENT:      Head: Normocephalic and atraumatic.   Cardiovascular:      Rate and Rhythm: Normal rate and regular rhythm.      Heart sounds: Normal heart sounds.   Pulmonary:      Effort: Pulmonary effort is normal.      Breath sounds: Normal breath sounds.   Chest:   Breasts:     Right: No swelling, bleeding, inverted nipple, mass, nipple discharge, skin change or tenderness.      Left: Skin change (surgical scars) present. No swelling, bleeding, inverted nipple, mass, nipple discharge or tenderness.   Abdominal:      Palpations: Abdomen is soft. There is no mass.      Tenderness: There is no abdominal tenderness.   Musculoskeletal:         General: Normal range of motion.      Cervical back: Normal range of motion.   Lymphadenopathy:      Upper Body:      Right upper body: No supraclavicular or axillary adenopathy.      Left upper body: No supraclavicular or axillary adenopathy.   Skin:     General: Skin is warm and dry.      Findings: No rash.   Neurological:      Mental Status: She is alert and oriented to person, place, and time.   Psychiatric:         Speech: Speech normal.             Discussed symptoms related to disease recurrence, Yes    Evaluated for late effects related to cancer treatment, Yes    Screening current for cervical cancer, Yes, describe: pap 1/2023    Screening current for colon cancer, Yes, describe: up to date    Cancer rehabilitation services addressed, Yes, describe: " completed strength ABC    Screening current for osteoporosis, Yes, describe: dxa 10/2023

## 2024-11-25 ENCOUNTER — OFFICE VISIT (OUTPATIENT)
Dept: HEMATOLOGY ONCOLOGY | Facility: CLINIC | Age: 64
End: 2024-11-25
Payer: COMMERCIAL

## 2024-11-25 VITALS
SYSTOLIC BLOOD PRESSURE: 124 MMHG | HEIGHT: 62 IN | OXYGEN SATURATION: 98 % | RESPIRATION RATE: 18 BRPM | BODY MASS INDEX: 29.81 KG/M2 | WEIGHT: 162 LBS | HEART RATE: 84 BPM | DIASTOLIC BLOOD PRESSURE: 80 MMHG | TEMPERATURE: 96.7 F

## 2024-11-25 DIAGNOSIS — Z79.811 AROMATASE INHIBITOR USE: ICD-10-CM

## 2024-11-25 DIAGNOSIS — C50.912 INVASIVE DUCTAL CARCINOMA OF BREAST, FEMALE, LEFT (HCC): ICD-10-CM

## 2024-11-25 DIAGNOSIS — Z17.0 MALIGNANT NEOPLASM OF CENTRAL PORTION OF LEFT BREAST IN FEMALE, ESTROGEN RECEPTOR POSITIVE (HCC): Primary | ICD-10-CM

## 2024-11-25 DIAGNOSIS — C50.112 MALIGNANT NEOPLASM OF CENTRAL PORTION OF LEFT BREAST IN FEMALE, ESTROGEN RECEPTOR POSITIVE (HCC): Primary | ICD-10-CM

## 2024-11-25 PROCEDURE — 99214 OFFICE O/P EST MOD 30 MIN: CPT | Performed by: INTERNAL MEDICINE

## 2024-11-25 NOTE — PROGRESS NOTES
Hematology/Oncology Outpatient Follow-up  Jocelin Cerrato 64 y.o. female 1960 [id number]    Date:  11/25/2024        Assessment and Plan:  1. Malignant neoplasm of central portion of left breast in female, estrogen receptor positive (HCC) (Primary)  Stage 1A (pT1c, pN0, cM0, Grade 2, ER:+ NC:+ HER-2: 1+) Left Breast Cancer .  The patient is tolerating the endocrine therapy relatively well.    She seems to be up-to-date with her yearly mammogram.    - CBC and differential; Future  - Comprehensive metabolic panel; Future  - Magnesium; Future    2. Aromatase inhibitor use  She will be due for her bone density scan around October 2025.  She was encouraged to take vitamin D supplements daily.  We will see her again in a year for follow-up.    3. Invasive ductal carcinoma of breast, female, left (HCC)  As above.  - DXA body comp analysis; Future        HPI:  The patient came today for a follow-up visit.  She continues to take the anastrozole which she is tolerating relatively well.  Blood work on 11/12/2024 was normal.    Oncology History   Malignant neoplasm of central portion of left breast in female, estrogen receptor positive (HCC)   9/19/2023 Biopsy    Left breast biopsy 12:00 3cmfn:  - Invasive ductal carcinoma  Grade 2  ER 90%  NC 80%  HER2 negative     9/19/2023 -  Cancer Staged    Staging form: Breast, AJCC 8th Edition  - Clinical stage from 9/19/2023: Stage IA (cT1, cN0, cM0, G2, ER+, NC+, HER2-) - Signed by Flor Del Toro MD on 10/4/2023  Stage prefix: Initial diagnosis  Method of lymph node assessment: Clinical  Histologic grading system: 3 grade system       10/2/2023 [facility] Testing     [facility] (8 genes): NUZHAT, BRCA1, BRCA2, CDH1, CHEK2, PALB2, PTEN, TP53 with reflex to [employer] CustomNext Cancer Panel + RNA (47 genes): APC, AXIN2, BARD1, BMPR1A, BRIP1, CDK4, CDKN2A, CTNNA1, DICER1, EPCAM, GREM1, HOXB13, KIT, MEN1, MLH1, MSH2, MSH3, MSH6, MUTYH, NBN, NF1, NTHL1, PDGFRA, PMS2, POLD1, POLE,  RAD50, RAD51C, RAD51D, SDHA, SDHB, SDHC, SDHD, SMAD4, SMARCA4, STK11, TSC1, TSC2, VHL     Result: Negative      11/3/2023 Surgery    Left breast lumpectomy with sentinel lymph node biopsy:  - clear margins  - 0/2 lymph nodes    Dr. Del Toro     11/3/2023 -  Cancer Staged    Staging form: Breast, AJCC 8th Edition  - Pathologic stage from 11/3/2023: Stage IA (pT1c, pN0(sn), cM0, G2, ER+, VT+, HER2-) - Signed by Flor Del Toro MD on 11/22/2023  Stage prefix: Initial diagnosis  Method of lymph node assessment: [place] lymph node biopsy  Histologic grading system: 3 grade system       12/2023 -  Hormone Therapy    Anastrozole  Dr. Rios     12/2023 Genomic Testing    Oncotype DX: 5 low risk     1/2/2024 - 1/12/2024 Radiation      Plan ID Energy Fractions Dose per Fraction (cGy) Dose Correction (cGy) Total Dose Delivered (cGy) Elapsed Days   BH L APBI 6X 5 / 5 600 0 3,000 10      Dr. Nguyen         Interval history:    ROS: Review of Systems   Constitutional:  Positive for fatigue. Negative for chills and fever.   HENT:  Negative for ear pain and sore throat.    Eyes:  Negative for pain and visual disturbance.   Respiratory:  Positive for cough. Negative for shortness of breath.    Cardiovascular:  Negative for chest pain and palpitations.   Gastrointestinal:  Negative for abdominal pain and vomiting.   Genitourinary:  Negative for dysuria and hematuria.   Musculoskeletal:  Negative for arthralgias and back pain.   Skin:  Negative for color change and rash.   Neurological:  Positive for dizziness and headaches. Negative for seizures and syncope.   Psychiatric/Behavioral:  Positive for sleep disturbance.    All other systems reviewed and are negative.      Past Medical History:   Diagnosis Date    Allergies     Anal fissure     Back pain     chronic    Breast cancer (HCC)     Endometriosis     Environmental allergies     Genital herpes simplex        Past Surgical History:   Procedure Laterality Date    BREAST BIOPSY Left  2023    BREAST LUMPECTOMY Left 11/3/2023    Procedure: LUMPECTOMY BREAST WYATT LOCALIZED;  Surgeon: Flor Del Toro MD;  Location: [facility];  Service: Surgical Oncology     SECTION      COLONOSCOPY      HEMORROIDECTOMY      LAPAROSCOPIC ENDOMETRIOSIS FULGURATION      LYMPH NODE BIOPSY Left 11/3/2023    Procedure: BX LYMPH NODE SENTINEL, LYMPHATIC MAPPING, LYMPHOSCINTIGRAPHY;;  Surgeon: Flor Del Toro MD;  Location: [facility];  Service: Surgical Oncology    MN SPHINCTEROTOMY ANAL DIVISION SPHINCTER SPX N/A 2016    Procedure: LEFT LATERAL CLOSED PARTIAL INTERNAL SPHINCTEROTOMY; FISSURECTOMY; POST ANAL ROTATING SKIN FLAP ANOPLASTY;  Surgeon: Miladis Bardales MD;  Location: [facility];  Service: Colorectal    TONSILLECTOMY      TUBAL LIGATION      US GUIDED BREAST BIOPSY LEFT COMPLETE Left 2023    WISDOM TOOTH EXTRACTION         Social History     Socioeconomic History    Marital status:      Spouse name: None    Number of children: None    Years of education: None    Highest education level: None   Occupational History    None   Tobacco Use    Smoking status: Former     Current packs/day: 0.00     Types: Cigarettes     Start date:      Quit date:      Years since quittin.9    Smokeless tobacco: Never   Vaping Use    Vaping status: Never Used   Substance and Sexual Activity    Alcohol use: Not Currently     Alcohol/week: 3.0 standard drinks of alcohol     Types: 3 Standard drinks or equivalent per week     Comment: Socially    Drug use: Never    Sexual activity: Never     Partners: Male     Birth control/protection: Post-menopausal   Other Topics Concern    None   Social History Narrative    None     Social Drivers of Health     Financial Resource Strain: Not on file   Food Insecurity: Not on file   Transportation Needs: Not on file   Physical Activity: Not on file   Stress: Not on file   Social Connections: Not on file   Intimate Partner Violence: Not on file   Housing  "Stability: Not on file       Family History   Problem Relation Age of Onset    Diabetes Mother         non insulin    No Known Problems Father     Skin cancer Brother 56    Breast cancer Maternal Aunt         60's    No Known Problems Paternal Aunt     No Known Problems Paternal Aunt     Cancer Maternal Grandmother         80's    No Known Problems Maternal Grandfather     Colon cancer Paternal Grandmother         50's    Cancer Paternal Grandfather         unk type in his 90's    Breast cancer Cousin         40's       Allergies   Allergen Reactions    Pollen Extract Allergic Rhinitis     Nasal congestion         Current Outpatient Medications:     anastrozole (ARIMIDEX) 1 mg tablet, TAKE 1 TABLET BY MOUTH EVERY DAY, Disp: 90 tablet, Rfl: 4    Azelastine HCl 137 MCG/SPRAY SOLN, spray 1 spray into each nostril twice a day, Disp: , Rfl:     buPROPion (WELLBUTRIN XL) 150 mg 24 hr tablet, Take by mouth, Disp: , Rfl:     Cholecalciferol 25 MCG (1000 UT) capsule, Take 5,000 Units by mouth daily, Disp: , Rfl:     cyanocobalamin (VITAMIN B-12) 1000 MCG tablet, , Disp: , Rfl:     fluticasone (FLONASE) 50 mcg/act nasal spray, , Disp: , Rfl:     Glucosamine-Chondroitin 1907-3262 MG/30ML LIQD, , Disp: , Rfl:     levocetirizine (XYZAL) 5 MG tablet, Take 5 mg by mouth every evening, Disp: , Rfl:     Multiple Vitamins-Minerals (MULTIVITAMIN ADULT EXTRA C PO), Take 1 capsule by mouth, Disp: , Rfl:     Omega-3 Fatty Acids (Fish Oil) 1200 MG CAPS, , Disp: , Rfl:     zaleplon (SONATA) 5 MG capsule, Take 5 mg by mouth daily as needed, Disp: , Rfl:       Physical Exam:  /80 (BP Location: Left arm, Patient Position: Sitting, Cuff Size: Adult)   Pulse 84   Temp (!) 96.7 °F (35.9 °C)   Resp 18   Ht 5' 2\" (1.575 m)   Wt 73.5 kg (162 lb)   LMP  (LMP Unknown)   SpO2 98%   BMI 29.63 kg/m²     Physical Exam  Constitutional:       General: She is not in acute distress.     Appearance: She is well-developed. She is not diaphoretic. "   HENT:      Head: Normocephalic and atraumatic.      Nose: Nose normal.   Eyes:      General: No scleral icterus.        Right eye: No discharge.         Left eye: No discharge.      Conjunctiva/sclera: Conjunctivae normal.      Pupils: Pupils are equal, round, and reactive to light.   Neck:      Thyroid: No thyromegaly.      Vascular: No JVD.      Trachea: No tracheal deviation.   Cardiovascular:      Rate and Rhythm: Normal rate and regular rhythm.      Heart sounds: Normal heart sounds. No murmur heard.     No friction rub.   Pulmonary:      Effort: Pulmonary effort is normal. No respiratory distress.      Breath sounds: Normal breath sounds. No stridor. No wheezing or rales.   Chest:      Chest wall: No tenderness.   Abdominal:      General: There is no distension.      Palpations: Abdomen is soft. There is no hepatomegaly or splenomegaly.      Tenderness: There is no abdominal tenderness. There is no guarding or rebound.   Musculoskeletal:         General: No tenderness or deformity. Normal range of motion.      Cervical back: Normal range of motion and neck supple.   Lymphadenopathy:      Cervical: No cervical adenopathy.   Skin:     General: Skin is warm and dry.      Coloration: Skin is not pale.      Findings: No erythema or rash.   Neurological:      Mental Status: She is alert and oriented to person, place, and time.      Cranial Nerves: No cranial nerve deficit.      Coordination: Coordination normal.      Deep Tendon Reflexes: Reflexes are normal and symmetric.   Psychiatric:         Behavior: Behavior normal.         Thought Content: Thought content normal.         Judgment: Judgment normal.           Labs:  Lab Results   Component Value Date    WBC 5.36 11/12/2024    HGB 13.5 11/12/2024    HCT 41.8 11/12/2024    MCV 95 11/12/2024     11/12/2024     Lab Results   Component Value Date    K 4.7 11/12/2024     11/12/2024    CO2 33 (H) 11/12/2024    BUN 18 11/12/2024    CREATININE 0.89  11/12/2024    GLUF 86 11/12/2024    CALCIUM 9.6 11/12/2024    AST 19 11/12/2024    ALT 18 11/12/2024    ALKPHOS 74 11/12/2024    EGFR 69 11/12/2024     Lab Results   Component Value Date    TSH 1.42 04/25/2024       Patient voiced understanding and agreement in the above discussion. Aware to contact our office with questions/symptoms in the interim.

## 2025-01-19 DIAGNOSIS — C50.112 MALIGNANT NEOPLASM OF CENTRAL PORTION OF LEFT BREAST IN FEMALE, ESTROGEN RECEPTOR POSITIVE (HCC): ICD-10-CM

## 2025-01-19 DIAGNOSIS — Z17.0 MALIGNANT NEOPLASM OF CENTRAL PORTION OF LEFT BREAST IN FEMALE, ESTROGEN RECEPTOR POSITIVE (HCC): ICD-10-CM

## 2025-01-20 RX ORDER — ANASTROZOLE 1 MG/1
1 TABLET ORAL DAILY
Qty: 90 TABLET | Refills: 4 | Status: SHIPPED | OUTPATIENT
Start: 2025-01-20

## 2025-02-10 NOTE — PROGRESS NOTES
Assessment/Plan:    Will continue with breast care team recommendations. ASCCP guidelines reviewed and pap with cotesting noted to be up to date; this low risk patient was advised she meets criteria to d/c pap screening at age 66. No pap done. DEXA scheduled and colonoscopy noted to be up to date. Reviewed diet/activity recommendations Calcium 1200 mg and Vit D 600-1000 IU daily.  Discussed postmenopausal considerations and symptoms to report. Kegel exercises as instructed. RTO in one year for routine annual gyn exam or sooner PRN.        Diagnoses and all orders for this visit:    Malignant neoplasm of central portion of left breast in female, estrogen receptor positive (HCC)    Encounter for gynecological examination (general) (routine) without abnormal findings        Subjective:      Patient ID: Jocelin Cerrato is a 65 y.o. female.    This patient presents for routine annual gyn exam.  Undergoing tx for left breast cancer diagnosed in 2023. Left lumpectomy and sentinel node biopsy, 11/3/23. Pt just completed radiation. Dx B/L mammo 9/18/24.  Hx of genital herpes. Denies recent outbreaks.  She denies  bleeding or spotting, VM sx, pelvic pain, breast concerns, abnormal discharge, bowel/bladder dysfunction, depression/anx.   Not sexually active for over 20 years. She is agreeable to stopping paps. Denies a hx of abnormal paps. Pap/HPV up to date and normal, 1/11/23.  Osteoporosis screening normal 10/16/23, next scheduled. Colonoscopy 4/28/16.  Family hx of breast and colon cancer.           The following portions of the patient's history were reviewed and updated as appropriate: allergies, current medications, past family history, past medical history, past social history, past surgical history and problem list.    Review of Systems   Constitutional: Negative.    Respiratory: Negative.     Cardiovascular: Negative.    Gastrointestinal: Negative.    Endocrine: Negative.    Genitourinary:  Negative for dysuria,  "frequency, pelvic pain, urgency, vaginal bleeding, vaginal discharge and vaginal pain.   Musculoskeletal: Negative.    Skin: Negative.    Neurological: Negative.    Psychiatric/Behavioral: Negative.           Objective:      Ht 5' 2\" (1.575 m)   Wt 73.5 kg (162 lb)   LMP  (LMP Unknown)   BMI 29.63 kg/m²          Physical Exam  Vitals and nursing note reviewed. Exam conducted with a chaperone present.   Constitutional:       Appearance: Normal appearance. She is well-developed.   HENT:      Head: Normocephalic and atraumatic.   Neck:      Thyroid: No thyroid mass or thyromegaly.   Cardiovascular:      Rate and Rhythm: Normal rate and regular rhythm.      Heart sounds: Normal heart sounds.   Pulmonary:      Effort: Pulmonary effort is normal.      Breath sounds: Normal breath sounds.   Chest:   Breasts:     Breasts are symmetrical.      Right: No inverted nipple, mass, nipple discharge, skin change or tenderness.      Left: Skin change (surgical changes noted) present. No inverted nipple, mass, nipple discharge or tenderness.   Abdominal:      General: Bowel sounds are normal.      Palpations: Abdomen is soft.      Tenderness: There is no abdominal tenderness.      Hernia: There is no hernia in the left inguinal area or right inguinal area.   Genitourinary:     General: Normal vulva.      Exam position: Supine.      Pubic Area: No rash.       Labia:         Right: No rash, tenderness, lesion or injury.         Left: No rash, tenderness, lesion or injury.       Urethra: No prolapse, urethral pain, urethral swelling or urethral lesion.      Vagina: Normal. No signs of injury and foreign body. No vaginal discharge, erythema, tenderness, bleeding, lesions or prolapsed vaginal walls.      Cervix: No cervical motion tenderness, discharge, friability, lesion, erythema, cervical bleeding or eversion.      Uterus: Not deviated, not enlarged, not fixed, not tender and no uterine prolapse.       Adnexa:         Right: No " mass, tenderness or fullness.          Left: No mass, tenderness or fullness.        Rectum: No external hemorrhoid.      Comments: Urethra normal without lesions  No bladder tenderness  Musculoskeletal:         General: Normal range of motion.      Cervical back: Normal range of motion and neck supple.   Lymphadenopathy:      Lower Body: No right inguinal adenopathy. No left inguinal adenopathy.   Skin:     General: Skin is warm and dry.   Neurological:      Mental Status: She is alert and oriented to person, place, and time.   Psychiatric:         Speech: Speech normal.         Behavior: Behavior normal. Behavior is cooperative.

## 2025-02-11 ENCOUNTER — ANNUAL EXAM (OUTPATIENT)
Dept: GYNECOLOGY | Facility: CLINIC | Age: 65
End: 2025-02-11
Payer: COMMERCIAL

## 2025-02-11 VITALS — HEIGHT: 62 IN | WEIGHT: 162 LBS | BODY MASS INDEX: 29.81 KG/M2

## 2025-02-11 DIAGNOSIS — Z17.0 MALIGNANT NEOPLASM OF CENTRAL PORTION OF LEFT BREAST IN FEMALE, ESTROGEN RECEPTOR POSITIVE (HCC): Primary | ICD-10-CM

## 2025-02-11 DIAGNOSIS — C50.112 MALIGNANT NEOPLASM OF CENTRAL PORTION OF LEFT BREAST IN FEMALE, ESTROGEN RECEPTOR POSITIVE (HCC): Primary | ICD-10-CM

## 2025-02-11 DIAGNOSIS — Z01.419 ENCOUNTER FOR GYNECOLOGICAL EXAMINATION (GENERAL) (ROUTINE) WITHOUT ABNORMAL FINDINGS: ICD-10-CM

## 2025-02-11 PROCEDURE — S0612 ANNUAL GYNECOLOGICAL EXAMINA: HCPCS | Performed by: OBSTETRICS & GYNECOLOGY

## (undated) DEVICE — SCD SEQUENTIAL COMPRESSION COMFORT SLEEVE MEDIUM KNEE LENGTH: Brand: KENDALL SCD

## (undated) DEVICE — BETHLEHEM UNIVERSAL MINOR GEN: Brand: CARDINAL HEALTH

## (undated) DEVICE — SUT MONOCRYL 3-0 SH 27 IN Y416H

## (undated) DEVICE — LIGACLIP MCA MULTIPLE CLIP APPLIERS, 20 SMALL CLIPS: Brand: LIGACLIP

## (undated) DEVICE — BRA SURGICAL SZ LGE (36-39)

## (undated) DEVICE — WET SKIN PREP TRAY: Brand: MEDLINE INDUSTRIES, INC.

## (undated) DEVICE — GLOVE SRG BIOGEL 6

## (undated) DEVICE — NEEDLE 25G X 1 1/2

## (undated) DEVICE — SINGLE PORT MANIFOLD: Brand: NEPTUNE 2

## (undated) DEVICE — GAUZE SPONGES,16 PLY: Brand: CURITY

## (undated) DEVICE — INTENDED FOR TISSUE SEPARATION, AND OTHER PROCEDURES THAT REQUIRE A SHARP SURGICAL BLADE TO PUNCTURE OR CUT.: Brand: BARD-PARKER SAFETY BLADES SIZE 15, STERILE

## (undated) DEVICE — PLUMEPEN PRO 10FT

## (undated) DEVICE — ADHESIVE SKIN HIGH VISCOSITY EXOFIN 1ML

## (undated) DEVICE — MEDI-VAC YANKAUER SUCTION HANDLE W/BULBOUS AND CONTROL VENT: Brand: CARDINAL HEALTH

## (undated) DEVICE — TUBING SUCTION 5MM X 12 FT

## (undated) DEVICE — PROVE COVER: Brand: UNBRANDED

## (undated) DEVICE — SUT MONOCRYL 4-0 PS-2 27 IN Y426H

## (undated) DEVICE — DRAPE SHEET THREE QUARTER

## (undated) DEVICE — SUPER SPONGES,MEDIUM: Brand: KERLIX